# Patient Record
Sex: FEMALE | Race: WHITE | NOT HISPANIC OR LATINO | Employment: OTHER | URBAN - METROPOLITAN AREA
[De-identification: names, ages, dates, MRNs, and addresses within clinical notes are randomized per-mention and may not be internally consistent; named-entity substitution may affect disease eponyms.]

---

## 2023-06-05 DIAGNOSIS — J30.9 ALLERGIC RHINITIS, UNSPECIFIED: ICD-10-CM

## 2023-06-05 RX ORDER — FLUTICASONE PROPIONATE 50 MCG
1 SPRAY, SUSPENSION (ML) NASAL 2 TIMES DAILY
Qty: 48 ML | Refills: 3 | Status: SHIPPED | OUTPATIENT
Start: 2023-06-05

## 2023-06-06 PROBLEM — K57.90 DIVERTICULOSIS: Status: ACTIVE | Noted: 2023-06-06

## 2023-06-06 PROBLEM — H01.006 BLEPHARITIS OF BOTH EYES: Status: ACTIVE | Noted: 2023-06-06

## 2023-06-06 PROBLEM — F41.9 MILD ANXIETY: Status: ACTIVE | Noted: 2023-06-06

## 2023-06-06 PROBLEM — J06.9 VIRAL UPPER RESPIRATORY TRACT INFECTION: Status: ACTIVE | Noted: 2023-06-06

## 2023-06-06 PROBLEM — Z97.3 WEARS GLASSES: Status: ACTIVE | Noted: 2023-06-06

## 2023-06-06 PROBLEM — H69.93 DYSFUNCTION OF BOTH EUSTACHIAN TUBES: Status: ACTIVE | Noted: 2023-06-06

## 2023-06-06 PROBLEM — H10.30 ACUTE CONJUNCTIVITIS: Status: ACTIVE | Noted: 2023-06-06

## 2023-06-06 PROBLEM — H01.003 BLEPHARITIS OF BOTH EYES: Status: ACTIVE | Noted: 2023-06-06

## 2023-06-06 PROBLEM — E11.59 OBESITY, DIABETES, AND HYPERTENSION SYNDROME (MULTI): Status: ACTIVE | Noted: 2023-06-06

## 2023-06-06 PROBLEM — E11.9 DIABETES MELLITUS (MULTI): Status: ACTIVE | Noted: 2023-06-06

## 2023-06-06 PROBLEM — H93.13 TINNITUS OF BOTH EARS: Status: ACTIVE | Noted: 2023-06-06

## 2023-06-06 PROBLEM — J32.9 SINUSITIS: Status: ACTIVE | Noted: 2023-06-06

## 2023-06-06 PROBLEM — H52.4 BILATERAL PRESBYOPIA: Status: ACTIVE | Noted: 2023-06-06

## 2023-06-06 PROBLEM — E66.9 OBESITY, DIABETES, AND HYPERTENSION SYNDROME (MULTI): Status: ACTIVE | Noted: 2023-06-06

## 2023-06-06 PROBLEM — B35.4 TINEA CORPORIS: Status: ACTIVE | Noted: 2023-06-06

## 2023-06-06 PROBLEM — E55.9 VITAMIN D DEFICIENCY: Status: ACTIVE | Noted: 2023-06-06

## 2023-06-06 PROBLEM — E11.65 TYPE 2 DIABETES MELLITUS WITH HYPERGLYCEMIA (MULTI): Status: ACTIVE | Noted: 2023-06-06

## 2023-06-06 PROBLEM — J30.9 ALLERGIC RHINITIS: Status: ACTIVE | Noted: 2023-06-06

## 2023-06-06 PROBLEM — D12.6 ADENOMATOUS COLON POLYP: Status: ACTIVE | Noted: 2023-06-06

## 2023-06-06 PROBLEM — M19.90 ARTHRITIS: Status: ACTIVE | Noted: 2023-06-06

## 2023-06-06 PROBLEM — I10 HYPERTENSION: Status: ACTIVE | Noted: 2023-06-06

## 2023-06-06 PROBLEM — H52.03 HYPERMETROPIA OF BOTH EYES: Status: ACTIVE | Noted: 2023-06-06

## 2023-06-06 PROBLEM — J45.909 ASTHMA (HHS-HCC): Status: ACTIVE | Noted: 2023-06-06

## 2023-06-06 PROBLEM — E78.5 HYPERLIPIDEMIA: Status: ACTIVE | Noted: 2023-06-06

## 2023-06-06 PROBLEM — L30.9 ECZEMA: Status: ACTIVE | Noted: 2023-06-06

## 2023-06-06 PROBLEM — G89.29 CHRONIC PAIN OF LEFT KNEE: Status: ACTIVE | Noted: 2023-06-06

## 2023-06-06 PROBLEM — F43.21 SITUATIONAL DEPRESSION: Status: ACTIVE | Noted: 2023-06-06

## 2023-06-06 PROBLEM — H25.13 NUCLEAR SCLEROSIS OF BOTH EYES: Status: ACTIVE | Noted: 2023-06-06

## 2023-06-06 PROBLEM — E11.69 OBESITY, DIABETES, AND HYPERTENSION SYNDROME (MULTI): Status: ACTIVE | Noted: 2023-06-06

## 2023-06-06 PROBLEM — I15.2 OBESITY, DIABETES, AND HYPERTENSION SYNDROME (MULTI): Status: ACTIVE | Noted: 2023-06-06

## 2023-06-06 PROBLEM — M25.562 CHRONIC PAIN OF LEFT KNEE: Status: ACTIVE | Noted: 2023-06-06

## 2023-06-06 PROBLEM — E66.3 OVERWEIGHT: Status: ACTIVE | Noted: 2023-06-06

## 2023-06-06 PROBLEM — R60.9 EDEMA: Status: ACTIVE | Noted: 2023-06-06

## 2023-06-06 RX ORDER — DULAGLUTIDE 0.75 MG/.5ML
0.75 INJECTION, SOLUTION SUBCUTANEOUS
COMMUNITY
End: 2024-06-06 | Stop reason: SDUPTHER

## 2023-06-06 RX ORDER — SERTRALINE HYDROCHLORIDE 50 MG/1
1 TABLET, FILM COATED ORAL
COMMUNITY
Start: 2020-05-06 | End: 2024-03-07 | Stop reason: ALTCHOICE

## 2023-06-06 RX ORDER — MULTIVIT-MIN/IRON/FOLIC ACID/K 18-600-40
CAPSULE ORAL
COMMUNITY
Start: 2021-09-30

## 2023-06-06 RX ORDER — PERPHENAZINE 8 MG
TABLET ORAL
COMMUNITY
Start: 2022-01-06

## 2023-06-06 RX ORDER — VIT C/E/ZN/COPPR/LUTEIN/ZEAXAN 250MG-90MG
1 CAPSULE ORAL DAILY
COMMUNITY
Start: 2021-09-30 | End: 2023-10-11 | Stop reason: SDUPTHER

## 2023-06-06 RX ORDER — SERTRALINE HYDROCHLORIDE 100 MG/1
TABLET, FILM COATED ORAL
COMMUNITY
Start: 2023-06-04 | End: 2023-06-15 | Stop reason: ALTCHOICE

## 2023-06-06 RX ORDER — ALPRAZOLAM 0.5 MG/1
TABLET ORAL
COMMUNITY
Start: 2013-04-17 | End: 2023-11-03 | Stop reason: ALTCHOICE

## 2023-06-06 RX ORDER — HYDROCHLOROTHIAZIDE 25 MG/1
25 TABLET ORAL DAILY
COMMUNITY
End: 2023-08-02 | Stop reason: SDUPTHER

## 2023-06-06 RX ORDER — EPINEPHRINE 0.22MG
1 AEROSOL WITH ADAPTER (ML) INHALATION DAILY
COMMUNITY
Start: 2017-11-24

## 2023-06-06 RX ORDER — BLOOD SUGAR DIAGNOSTIC
STRIP MISCELLANEOUS
COMMUNITY
Start: 2021-10-01

## 2023-06-06 RX ORDER — CLOBETASOL PROPIONATE 0.5 MG/G
CREAM TOPICAL
COMMUNITY
Start: 2018-06-25

## 2023-06-06 RX ORDER — ALBUTEROL SULFATE 90 UG/1
AEROSOL, METERED RESPIRATORY (INHALATION)
COMMUNITY
Start: 2013-01-29

## 2023-06-06 RX ORDER — SENNOSIDES/DOCUSATE SODIUM 8.6MG-50MG
TABLET ORAL
COMMUNITY
Start: 2021-09-30

## 2023-06-06 RX ORDER — CALCIUM CARBONATE 300MG(750)
1 TABLET,CHEWABLE ORAL DAILY
COMMUNITY
Start: 2022-05-10

## 2023-06-15 ENCOUNTER — OFFICE VISIT (OUTPATIENT)
Dept: PRIMARY CARE | Facility: CLINIC | Age: 73
End: 2023-06-15
Payer: MEDICARE

## 2023-06-15 VITALS
SYSTOLIC BLOOD PRESSURE: 138 MMHG | TEMPERATURE: 97.2 F | RESPIRATION RATE: 16 BRPM | DIASTOLIC BLOOD PRESSURE: 88 MMHG | BODY MASS INDEX: 37.8 KG/M2 | WEIGHT: 200.2 LBS | OXYGEN SATURATION: 97 % | HEART RATE: 90 BPM | HEIGHT: 61 IN

## 2023-06-15 DIAGNOSIS — K31.7 GASTRIC POLYP: Chronic | ICD-10-CM

## 2023-06-15 DIAGNOSIS — I10 ESSENTIAL HYPERTENSION WITH GOAL BLOOD PRESSURE LESS THAN 130/85: Chronic | ICD-10-CM

## 2023-06-15 DIAGNOSIS — E55.9 VITAMIN D DEFICIENCY: Chronic | ICD-10-CM

## 2023-06-15 DIAGNOSIS — M19.90 ARTHRITIS: Chronic | ICD-10-CM

## 2023-06-15 DIAGNOSIS — E78.5 DYSLIPIDEMIA, GOAL LDL BELOW 70: Chronic | ICD-10-CM

## 2023-06-15 DIAGNOSIS — D12.6 ADENOMATOUS POLYP OF COLON, UNSPECIFIED PART OF COLON: Chronic | ICD-10-CM

## 2023-06-15 DIAGNOSIS — G89.29 CHRONIC RIGHT SHOULDER PAIN: Chronic | ICD-10-CM

## 2023-06-15 DIAGNOSIS — R10.13 DYSPEPSIA: Primary | Chronic | ICD-10-CM

## 2023-06-15 DIAGNOSIS — E66.01 CLASS 2 SEVERE OBESITY WITH SERIOUS COMORBIDITY AND BODY MASS INDEX (BMI) OF 37.0 TO 37.9 IN ADULT, UNSPECIFIED OBESITY TYPE (MULTI): Chronic | ICD-10-CM

## 2023-06-15 DIAGNOSIS — M25.511 CHRONIC RIGHT SHOULDER PAIN: Chronic | ICD-10-CM

## 2023-06-15 DIAGNOSIS — E11.65 TYPE 2 DIABETES MELLITUS WITH HYPERGLYCEMIA, WITHOUT LONG-TERM CURRENT USE OF INSULIN (MULTI): Chronic | ICD-10-CM

## 2023-06-15 PROBLEM — E66.812 OBESITY, CLASS II, BMI 35-39.9: Status: ACTIVE | Noted: 2021-10-22

## 2023-06-15 PROBLEM — E66.9 OBESITY, CLASS II, BMI 35-39.9: Status: ACTIVE | Noted: 2021-10-22

## 2023-06-15 PROCEDURE — 3079F DIAST BP 80-89 MM HG: CPT | Performed by: INTERNAL MEDICINE

## 2023-06-15 PROCEDURE — 99214 OFFICE O/P EST MOD 30 MIN: CPT | Performed by: INTERNAL MEDICINE

## 2023-06-15 PROCEDURE — 1036F TOBACCO NON-USER: CPT | Performed by: INTERNAL MEDICINE

## 2023-06-15 PROCEDURE — 3008F BODY MASS INDEX DOCD: CPT | Performed by: INTERNAL MEDICINE

## 2023-06-15 PROCEDURE — 1160F RVW MEDS BY RX/DR IN RCRD: CPT | Performed by: INTERNAL MEDICINE

## 2023-06-15 PROCEDURE — 3051F HG A1C>EQUAL 7.0%<8.0%: CPT | Performed by: INTERNAL MEDICINE

## 2023-06-15 PROCEDURE — 3075F SYST BP GE 130 - 139MM HG: CPT | Performed by: INTERNAL MEDICINE

## 2023-06-15 PROCEDURE — 1159F MED LIST DOCD IN RCRD: CPT | Performed by: INTERNAL MEDICINE

## 2023-06-15 ASSESSMENT — LIFESTYLE VARIABLES: HOW OFTEN DO YOU HAVE A DRINK CONTAINING ALCOHOL: MONTHLY OR LESS

## 2023-06-15 ASSESSMENT — PATIENT HEALTH QUESTIONNAIRE - PHQ9
SUM OF ALL RESPONSES TO PHQ9 QUESTIONS 1 AND 2: 0
1. LITTLE INTEREST OR PLEASURE IN DOING THINGS: NOT AT ALL
2. FEELING DOWN, DEPRESSED OR HOPELESS: NOT AT ALL

## 2023-06-15 NOTE — PROGRESS NOTES
"Subjective   Patient ID: Yoselyn Montana is a 73 y.o. female who presents for Follow-up.    Here for follow-up and review.  Overall doing fairly well.  She will be seeing her endocrinologist in July.  She is frustrated as she has remained on a restricted diet without any weight loss.  She has used my fitness pal to track her calories.  She has been doing water therapy 3 times weekly but is not really exercising aerobically she admits         Review of Systems    Objective   /88   Pulse 90   Temp 36.2 °C (97.2 °F)   Resp 16   Ht 1.549 m (5' 1\")   Wt 90.8 kg (200 lb 3.2 oz)   SpO2 97%   BMI 37.83 kg/m²     Physical Exam  Vitals reviewed.   Constitutional:       Appearance: Normal appearance.   HENT:      Head: Normocephalic and atraumatic.   Eyes:      General: No scleral icterus.        Right eye: No discharge.         Left eye: No discharge.      Extraocular Movements: Extraocular movements intact.      Conjunctiva/sclera: Conjunctivae normal.      Pupils: Pupils are equal, round, and reactive to light.   Cardiovascular:      Rate and Rhythm: Normal rate and regular rhythm.      Pulses: Normal pulses.      Heart sounds: Normal heart sounds. No murmur heard.  Pulmonary:      Effort: Pulmonary effort is normal.      Breath sounds: Normal breath sounds. No wheezing or rhonchi.   Musculoskeletal:         General: No deformity or signs of injury. Normal range of motion.      Cervical back: Normal range of motion and neck supple. No rigidity or tenderness.   Lymphadenopathy:      Cervical: No cervical adenopathy.   Skin:     General: Skin is warm and dry.      Findings: No rash.   Neurological:      General: No focal deficit present.      Mental Status: She is alert and oriented to person, place, and time. Mental status is at baseline.      Cranial Nerves: No cranial nerve deficit.      Sensory: No sensory deficit.      Gait: Gait normal.   Psychiatric:         Mood and Affect: Mood normal.         Behavior: " Behavior normal.         Thought Content: Thought content normal.         Judgment: Judgment normal.         Assessment/Plan   Problem List Items Addressed This Visit       Adenomatous colon polyp    Arthritis    Type 2 diabetes mellitus with hyperglycemia (CMS/HCC)    Vitamin D deficiency     Other Visit Diagnoses       Dyspepsia  (Chronic)   -  Primary    Chronic right shoulder pain  (Chronic)       Relevant Orders    Referral to Physical Therapy    Gastric polyp  (Chronic)       Essential hypertension with goal blood pressure less than 130/85  (Chronic)       Dyslipidemia, goal LDL below 70  (Chronic)       Class 2 severe obesity with serious comorbidity and body mass index (BMI) of 37.0 to 37.9 in adult, unspecified obesity type (CMS/HCC)  (Chronic)                Benzodiazepines:  What is the patient's goal of therapy?  Improve anxiety as needed  Is this being achieved with current treatment?  Yes      CSA: 2/27/23   PDMP  6/15/23  UDS:  9/30/19   HILL-7:  No data recorded    Activities of Daily Living:   Is your overall impression that this patient is benefiting (symptom reduction outweighs side effects) from benzodiazepine therapy? Yes     1. Physical Functioning: Better  2. Family Relationship: Better  3. Social Relationship: Better  4. Mood: Better  5. Sleep Patterns: Better  6. Overall Function: Better    Elevated 10 year cardiac risk assessment- 22% early October 2019- she declined medication for cholesterol instead opting for carbohydrate restricted diet and weight loss efforts.   Presently tolerating pravastatin. She will continue diabetic efforts now with endocrinologist and blood pressure plan and the baby aspirin daily     Diabetes- She has established with endocrinologist in early 2022- she saw her back in May 2022. A1c was 7.4%-now 7.6%. The plan to decide late summer in August whether or what medicine to start-either daily pills or a weekly shot she states. August '22 A1c 7.9%. Trulicity started.  She'll see her back in Jan '23. A1c 7% Jan '23. She will continue her medication and diabetic plan-overall improved              She will follow-up with her endocrinologist, Dr. Manley in July     Hypertension/edema/elevated weight/dyslipidemia- she will continue her weight loss efforts. She has stopped her statin in the past. Presently back on pravastatin.  Goal LDL under 70.   Blood pressure stable today. We will continue to follow with weight loss efforts. Improved on recheck.  LDL January 2023-103 on pravastatin     Mild chronic kidney disease-stage IIIA- improved on recent labs. We will continue to follow. Stable creatinine 0.99 January 2023.     Exercise routine- swimming at [facility] center 3 x wk. in the pool an hour w/ a water aerobic class. suggested 4. Just restarted walking last wk - plans to advance a block each week. encouraged indoor walking in weather. discussed cross training.   Encouraged swimming in the am, and walking before dinner. Presently swimming 1 hour 3 times weekly     BMI over 35- obesity with comorbid conditions-diabetes hypertension dyslipidemia- stable--we spoke about options. She doesn't really feel Weight Watchers in a classroom setting would be ideal for her. She has tried various diets including Weight Watchers and her own supplements. Clearly she needs to focus more on her own health.   Encouraged morning walking routine, 30 minutes 6 days weekly as her goal.presently she is swimming at the pool. She has met with a nutritional specialist. She will continue efforts on diet intake and exercise efforts    Unfortunately, her BMI has increased to 38. She is frustrated with this as nothing seems to work to lose weight. Will continue encourage efforts. Hopefully w/ Trulicity, she'll loose 10 lbs as her endocrinologist said            6/23-unfortunately her weight did not drop with the Trulicity.  She will review with her endocrinologist in July.  BMI remains at 37     Allergies / Hx  asthma / history of eustachian tube dysfunction/tinnitus - she will continue medications accordingly as above   More congestion this winter-years ago she was on allergy shots-encouraged her to get back in for an allergy evaluation with Dr. Yasir Holbrook- she will start a new drug called Viiti supplement and we will redo labs at next visit.      Left middle finger nail trauma- ecchymoses after she got this caught the door. As it turns out she was injured as a child with with this fingertip     Annual mammograms- mammogram updated January 2022 per GYN. Ordered at her 2/23 visit     Gynecologic care-she will follow up with her gynecologist regularly. She will est. sb/ Dr. Alfred after her GYN retired    Right shoulder pain-ongoing she will set up physical therapy     Left knee pain- improved with swimming before the pandemic. Encouraged walking, weight loss and Tylenol arthritis 652 pills twice daily as needed     Hand arthritis- she will limit overuse, and use the Tylenol arthritis     Colon polyps- colonoscopy updated January 2018 per Dr.. Melissa Templeton, Recommended 5 years-January 2023.               She plans to do this soon-she will call Dr. Templeton to set up this summer     Gastric polyp / occas GERD - noted on EGD. she'll follow per Dr.. Templeton as needed. no longer on PPI. EGD 1/18 unremarkable             With her family history of gastric cancer she plans to do an EGD soon in 2023.  She plans to add this on this summer     History of left hemithyroidectomy- around 2000, we'll continue to follow TFTs at least annually.     Vitamin D deficiency-she discontinued this sating headaches when she took vitamin D.   Recent vitamin D 12/21- low- she will advance this.   She will start an equivalent of 125 mg of Vitamin D daily with Viiti supplement,      Hx Osteopenia- bone density normal October 2020-     Right shoulder pain-normal range of motion but slight bicep pain proximally after pushing her father's  wheelchair a pill. At this point she doesn't feel further consultation is warranted. Less of an issue of late. Stable     Globus sensation-mainly noticeable with difficult stress. Negative EGD in the past per GI. Resolves with alprazolam suggesting stress response. She will obviously follow-up with Dr. Melissa Templeton in GI with any concerns        History of eczema- dermatology medications refilled as she does not plan to follow-up with dermatology at this point.   Encouraged consistent moisturizer use such as Cetaphil     Blepharitis- status post visit with Dr. Womack. She will use the baby shampoo as well as her lites. Improved of late     Tinnitus - bilateral-worse w/ stress. supplements not much better. Once again encouraged her to focus on allergy control     Vision exams-she will continue visits annually with Dr. Cain. At her January 2023 visit-early cataracts noted     Dental care-encouraged semiannual dental visits.        Situational depression/Anxiety- Sadly her mother passed away late March 2020. Her father passed away June 2020. [place] of 2019, she hasn't had really anybody else talk to she states. Support provided. She will continue the zoloft as ordered and continue Xanax as needed sparingly. Regarding the Xanax she has not needed this quite as often.  Her fiancé, Heber who has had very severe COPD on oxygen but fortunately has done better of late. Drug contract updated 2/27/2023.     Caregiving concerns-her long-term , Heber has had some advancing health issues. She is helping him to manage these issues       Encouraged her to update her living will last time       Flu shot each fall- updated 9/22.     Shingrix series completed     She will follow-up in 3 months, sooner as needed.

## 2023-06-16 PROBLEM — H10.30 ACUTE CONJUNCTIVITIS: Status: RESOLVED | Noted: 2023-06-06 | Resolved: 2023-06-16

## 2023-06-16 RX ORDER — MINERAL OIL
180 ENEMA (ML) RECTAL DAILY PRN
COMMUNITY

## 2023-06-16 RX ORDER — MULTIVITAMIN
1 TABLET ORAL DAILY
COMMUNITY

## 2023-07-21 ENCOUNTER — APPOINTMENT (OUTPATIENT)
Dept: PRIMARY CARE | Facility: CLINIC | Age: 73
End: 2023-07-21
Payer: MEDICARE

## 2023-07-24 ENCOUNTER — APPOINTMENT (OUTPATIENT)
Dept: PRIMARY CARE | Facility: CLINIC | Age: 73
End: 2023-07-24
Payer: MEDICARE

## 2023-08-02 DIAGNOSIS — R60.9 EDEMA, UNSPECIFIED TYPE: Primary | ICD-10-CM

## 2023-08-02 RX ORDER — HYDROCHLOROTHIAZIDE 25 MG/1
25 TABLET ORAL DAILY
Qty: 90 TABLET | Refills: 3 | Status: SHIPPED | OUTPATIENT
Start: 2023-08-02

## 2023-09-05 DIAGNOSIS — F43.21 ADJUSTMENT DISORDER WITH DEPRESSED MOOD: ICD-10-CM

## 2023-09-05 PROBLEM — E66.01 CLASS 2 SEVERE OBESITY WITH SERIOUS COMORBIDITY AND BODY MASS INDEX (BMI) OF 38.0 TO 38.9 IN ADULT (MULTI): Status: ACTIVE | Noted: 2023-09-05

## 2023-09-05 PROBLEM — Z63.6 CAREGIVER STRESS: Status: ACTIVE | Noted: 2023-09-05

## 2023-09-05 PROBLEM — E66.812 CLASS 2 SEVERE OBESITY WITH SERIOUS COMORBIDITY AND BODY MASS INDEX (BMI) OF 38.0 TO 38.9 IN ADULT: Status: ACTIVE | Noted: 2023-09-05

## 2023-09-05 PROBLEM — M25.511 RIGHT SHOULDER PAIN: Status: ACTIVE | Noted: 2023-09-05

## 2023-09-05 PROBLEM — E11.65 UNCONTROLLED TYPE 2 DIABETES MELLITUS WITH HYPERGLYCEMIA (MULTI): Status: ACTIVE | Noted: 2023-09-05

## 2023-09-05 RX ORDER — MULTIVITAMIN/IRON/FOLIC ACID 18MG-0.4MG
1 TABLET ORAL DAILY
COMMUNITY

## 2023-09-05 RX ORDER — INSULIN PUMP SYRINGE, 3 ML
EACH MISCELLANEOUS DAILY
COMMUNITY
Start: 2021-10-01

## 2023-09-05 RX ORDER — SERTRALINE HYDROCHLORIDE 100 MG/1
100 TABLET, FILM COATED ORAL NIGHTLY
Qty: 90 TABLET | Refills: 1 | Status: SHIPPED | OUTPATIENT
Start: 2023-09-05 | End: 2024-03-07 | Stop reason: ALTCHOICE

## 2023-09-05 RX ORDER — CHOLECALCIFEROL (VITAMIN D3) 125 MCG
125 CAPSULE ORAL DAILY
COMMUNITY

## 2023-09-08 ENCOUNTER — APPOINTMENT (OUTPATIENT)
Dept: PRIMARY CARE | Facility: CLINIC | Age: 73
End: 2023-09-08
Payer: MEDICARE

## 2023-09-22 RX ORDER — ASPIRIN 81 MG/1
TABLET ORAL
COMMUNITY

## 2023-09-22 RX ORDER — ELECTROLYTES/DEXTROSE
SOLUTION, ORAL ORAL
COMMUNITY

## 2023-10-02 ENCOUNTER — TREATMENT (OUTPATIENT)
Dept: PHYSICAL THERAPY | Facility: CLINIC | Age: 73
End: 2023-10-02
Payer: MEDICARE

## 2023-10-02 DIAGNOSIS — M25.511 RIGHT SHOULDER PAIN: ICD-10-CM

## 2023-10-02 DIAGNOSIS — M25.511 PAIN IN RIGHT SHOULDER: Primary | ICD-10-CM

## 2023-10-02 PROCEDURE — 97110 THERAPEUTIC EXERCISES: CPT | Mod: GP | Performed by: PHYSICAL THERAPIST

## 2023-10-02 PROCEDURE — 97140 MANUAL THERAPY 1/> REGIONS: CPT | Mod: GP | Performed by: PHYSICAL THERAPIST

## 2023-10-02 NOTE — PROGRESS NOTES
Physical Therapy Follow-up and Re-Assessment  Visit#: 2. Medicare.    Subjective: Pain and reaching ~ same. Not here recently sec to travelling and then having Covid.    Objective: AROM shoulder flex: wnl- pain ~100-140. Abd: ~80- pain. Hbb: ~SIJ- pain.    Treatment:   Therapeutic exercise: daniel Neff stretch- demo'd correctly with min cuing. Tban dbilat ER- HEP 50x, 2x/day.   Manual therapy: IV- GH flex, abd, caud and AP in flex and abd// flex: wnl- decr pain. Abd: wnl- decr pain.     Assessment: Incr ROM with rx..    Plan: Incr shoulder stabilization exer.

## 2023-10-09 ENCOUNTER — TREATMENT (OUTPATIENT)
Dept: PHYSICAL THERAPY | Facility: CLINIC | Age: 73
End: 2023-10-09
Payer: MEDICARE

## 2023-10-09 DIAGNOSIS — M25.511 PAIN IN RIGHT SHOULDER: Primary | ICD-10-CM

## 2023-10-09 PROCEDURE — 97110 THERAPEUTIC EXERCISES: CPT | Mod: GP | Performed by: PHYSICAL THERAPIST

## 2023-10-09 PROCEDURE — 97140 MANUAL THERAPY 1/> REGIONS: CPT | Mod: GP | Performed by: PHYSICAL THERAPIST

## 2023-10-09 NOTE — PROGRESS NOTES
Insurance reviewed   Visit number: 4  Approved number of visits: Medicare   Onset Date: 06/ 15/ 2023  Beginnin2023 Ending: 10/ 20/ 2023      Physical Therapy Follow-up     Precautions: none.   Fall risk: low.      Subjective:  Had incr right lat shoulder pain and periscapular pain the day after last visit- not sure if shoulder mobs or something she did at home.    Objective: AROM shoulder flex: wnl- pain. Abd: ~130- pain. Hbb: ~L5- pain.    Treatment:   Therapeutic exercise: Tband row, Tband bilat ER, ball rolling up wall into flex- all demo'd correctly.     IV-- RPA C6-T4// Flex: wnl w/o pain. Abd: ~160- decr pain. Hbb: ~L4- decr pain.       Assessment: Incr ROM with CS/TS mobs.     Plan: Incr shoulder stabilization exer.

## 2023-10-11 ENCOUNTER — OFFICE VISIT (OUTPATIENT)
Dept: ENDOCRINOLOGY | Facility: CLINIC | Age: 73
End: 2023-10-11
Payer: MEDICARE

## 2023-10-11 VITALS
HEART RATE: 73 BPM | HEIGHT: 61 IN | WEIGHT: 199.4 LBS | BODY MASS INDEX: 37.65 KG/M2 | TEMPERATURE: 98 F | DIASTOLIC BLOOD PRESSURE: 76 MMHG | SYSTOLIC BLOOD PRESSURE: 140 MMHG | RESPIRATION RATE: 16 BRPM

## 2023-10-11 DIAGNOSIS — Z79.4 TYPE 2 DIABETES MELLITUS WITH HYPERGLYCEMIA, WITH LONG-TERM CURRENT USE OF INSULIN (MULTI): ICD-10-CM

## 2023-10-11 DIAGNOSIS — E11.65 TYPE 2 DIABETES MELLITUS WITH HYPERGLYCEMIA, WITH LONG-TERM CURRENT USE OF INSULIN (MULTI): ICD-10-CM

## 2023-10-11 LAB
POC FINGERSTICK BLOOD GLUCOSE: 134 MG/DL (ref 70–100)
POC FINGERSTICK BLOOD GLUCOSE: 6.8 MG/DL (ref 70–100)

## 2023-10-11 PROCEDURE — 1036F TOBACCO NON-USER: CPT | Performed by: STUDENT IN AN ORGANIZED HEALTH CARE EDUCATION/TRAINING PROGRAM

## 2023-10-11 PROCEDURE — 82962 GLUCOSE BLOOD TEST: CPT | Performed by: STUDENT IN AN ORGANIZED HEALTH CARE EDUCATION/TRAINING PROGRAM

## 2023-10-11 PROCEDURE — 1126F AMNT PAIN NOTED NONE PRSNT: CPT | Performed by: STUDENT IN AN ORGANIZED HEALTH CARE EDUCATION/TRAINING PROGRAM

## 2023-10-11 PROCEDURE — 1160F RVW MEDS BY RX/DR IN RCRD: CPT | Performed by: STUDENT IN AN ORGANIZED HEALTH CARE EDUCATION/TRAINING PROGRAM

## 2023-10-11 PROCEDURE — 1159F MED LIST DOCD IN RCRD: CPT | Performed by: STUDENT IN AN ORGANIZED HEALTH CARE EDUCATION/TRAINING PROGRAM

## 2023-10-11 PROCEDURE — 3051F HG A1C>EQUAL 7.0%<8.0%: CPT | Performed by: STUDENT IN AN ORGANIZED HEALTH CARE EDUCATION/TRAINING PROGRAM

## 2023-10-11 PROCEDURE — 99214 OFFICE O/P EST MOD 30 MIN: CPT | Performed by: STUDENT IN AN ORGANIZED HEALTH CARE EDUCATION/TRAINING PROGRAM

## 2023-10-11 PROCEDURE — 3008F BODY MASS INDEX DOCD: CPT | Performed by: STUDENT IN AN ORGANIZED HEALTH CARE EDUCATION/TRAINING PROGRAM

## 2023-10-11 PROCEDURE — 3078F DIAST BP <80 MM HG: CPT | Performed by: STUDENT IN AN ORGANIZED HEALTH CARE EDUCATION/TRAINING PROGRAM

## 2023-10-11 PROCEDURE — 3077F SYST BP >= 140 MM HG: CPT | Performed by: STUDENT IN AN ORGANIZED HEALTH CARE EDUCATION/TRAINING PROGRAM

## 2023-10-11 RX ORDER — SEMAGLUTIDE 1.34 MG/ML
1 INJECTION, SOLUTION SUBCUTANEOUS
Qty: 9 ML | Refills: 1 | Status: SHIPPED | OUTPATIENT
Start: 2023-10-11 | End: 2023-10-16

## 2023-10-11 ASSESSMENT — PAIN SCALES - GENERAL: PAINLEVEL: 0-NO PAIN

## 2023-10-11 ASSESSMENT — ENCOUNTER SYMPTOMS: CONSTITUTIONAL NEGATIVE: 1

## 2023-10-11 NOTE — PROGRESS NOTES
Subjective   Patient ID: Yoselyn Montana is a 73 y.o. female who presents for Follow-up and Diabetes (Last A1C was 6.9% on 6.22.2023/Last glucose was 170 on 6.22.2023/).    Today's glucose was 134  Today's A1C was 6.8%  She lost 5 lbs   On ozempic for 7 weeks   Had mental fogginess for 2 weeks on it but that now settled       Review of Systems   Constitutional: Negative.        Objective   There were no vitals taken for this visit.    Physical Exam  Constitutional:       Appearance: Normal appearance.   Cardiovascular:      Rate and Rhythm: Normal rate and regular rhythm.   Pulmonary:      Effort: Pulmonary effort is normal.      Breath sounds: Normal breath sounds.   Musculoskeletal:      Cervical back: Neck supple.   Neurological:      Mental Status: She is alert.         Assessment/Plan       -  better controlled type 2 diabetes. with Hba1c of 6.8 % on GLP1   - increase Ozempic to 1mg weeklly  -not on metformin due to side effects with one of her family members while on it ( extreme fatigue)    -Hyperlipidemia controlled LDL , TG elevated on pravastatin 40 mg  not able tolerate fish oil due to diarrhea     RTC in 3-4 months

## 2023-10-12 DIAGNOSIS — Z79.4 TYPE 2 DIABETES MELLITUS WITH HYPERGLYCEMIA, WITH LONG-TERM CURRENT USE OF INSULIN (MULTI): ICD-10-CM

## 2023-10-12 DIAGNOSIS — E11.65 TYPE 2 DIABETES MELLITUS WITH HYPERGLYCEMIA, WITH LONG-TERM CURRENT USE OF INSULIN (MULTI): ICD-10-CM

## 2023-10-16 ENCOUNTER — APPOINTMENT (OUTPATIENT)
Dept: PHYSICAL THERAPY | Facility: CLINIC | Age: 73
End: 2023-10-16
Payer: MEDICARE

## 2023-10-16 DIAGNOSIS — Z79.4 TYPE 2 DIABETES MELLITUS WITH HYPERGLYCEMIA, WITH LONG-TERM CURRENT USE OF INSULIN (MULTI): ICD-10-CM

## 2023-10-16 DIAGNOSIS — E11.65 TYPE 2 DIABETES MELLITUS WITH HYPERGLYCEMIA, WITH LONG-TERM CURRENT USE OF INSULIN (MULTI): ICD-10-CM

## 2023-10-16 RX ORDER — SEMAGLUTIDE 1.34 MG/ML
1 INJECTION, SOLUTION SUBCUTANEOUS
Qty: 9 ML | Refills: 1 | Status: SHIPPED | OUTPATIENT
Start: 2023-10-16 | End: 2023-10-18

## 2023-10-18 RX ORDER — SEMAGLUTIDE 1.34 MG/ML
1 INJECTION, SOLUTION SUBCUTANEOUS
Qty: 9 ML | Refills: 1 | Status: SHIPPED | OUTPATIENT
Start: 2023-10-18 | End: 2023-10-23

## 2023-10-20 ENCOUNTER — APPOINTMENT (OUTPATIENT)
Dept: PHYSICAL THERAPY | Facility: CLINIC | Age: 73
End: 2023-10-20
Payer: MEDICARE

## 2023-10-20 DIAGNOSIS — Z79.4 TYPE 2 DIABETES MELLITUS WITH HYPERGLYCEMIA, WITH LONG-TERM CURRENT USE OF INSULIN (MULTI): ICD-10-CM

## 2023-10-20 DIAGNOSIS — E11.65 TYPE 2 DIABETES MELLITUS WITH HYPERGLYCEMIA, WITH LONG-TERM CURRENT USE OF INSULIN (MULTI): ICD-10-CM

## 2023-10-23 ENCOUNTER — APPOINTMENT (OUTPATIENT)
Dept: PHYSICAL THERAPY | Facility: CLINIC | Age: 73
End: 2023-10-23
Payer: MEDICARE

## 2023-10-23 DIAGNOSIS — E11.65 TYPE 2 DIABETES MELLITUS WITH HYPERGLYCEMIA, WITH LONG-TERM CURRENT USE OF INSULIN (MULTI): ICD-10-CM

## 2023-10-23 DIAGNOSIS — Z79.4 TYPE 2 DIABETES MELLITUS WITH HYPERGLYCEMIA, WITH LONG-TERM CURRENT USE OF INSULIN (MULTI): ICD-10-CM

## 2023-10-23 RX ORDER — SEMAGLUTIDE 1.34 MG/ML
1 INJECTION, SOLUTION SUBCUTANEOUS
Qty: 9 ML | Refills: 1 | Status: SHIPPED | OUTPATIENT
Start: 2023-10-23 | End: 2024-02-05 | Stop reason: ALTCHOICE

## 2023-10-23 RX ORDER — SEMAGLUTIDE 1.34 MG/ML
1 INJECTION, SOLUTION SUBCUTANEOUS
Qty: 9 ML | Refills: 1 | Status: SHIPPED | OUTPATIENT
Start: 2023-10-23 | End: 2023-10-23

## 2023-10-27 ENCOUNTER — APPOINTMENT (OUTPATIENT)
Dept: PHYSICAL THERAPY | Facility: CLINIC | Age: 73
End: 2023-10-27
Payer: MEDICARE

## 2023-10-30 ENCOUNTER — APPOINTMENT (OUTPATIENT)
Dept: PHYSICAL THERAPY | Facility: CLINIC | Age: 73
End: 2023-10-30
Payer: MEDICARE

## 2023-10-31 ENCOUNTER — OFFICE VISIT (OUTPATIENT)
Dept: PRIMARY CARE | Facility: CLINIC | Age: 73
End: 2023-10-31
Payer: MEDICARE

## 2023-10-31 VITALS
DIASTOLIC BLOOD PRESSURE: 84 MMHG | WEIGHT: 196.4 LBS | TEMPERATURE: 97.6 F | OXYGEN SATURATION: 95 % | RESPIRATION RATE: 16 BRPM | SYSTOLIC BLOOD PRESSURE: 138 MMHG | BODY MASS INDEX: 38.56 KG/M2 | HEART RATE: 75 BPM | HEIGHT: 60 IN

## 2023-10-31 DIAGNOSIS — I10 ESSENTIAL HYPERTENSION WITH GOAL BLOOD PRESSURE LESS THAN 130/85: Chronic | ICD-10-CM

## 2023-10-31 DIAGNOSIS — J30.1 SEASONAL ALLERGIC RHINITIS DUE TO POLLEN: ICD-10-CM

## 2023-10-31 DIAGNOSIS — E11.59 OBESITY, DIABETES, AND HYPERTENSION SYNDROME (MULTI): ICD-10-CM

## 2023-10-31 DIAGNOSIS — I15.2 OBESITY, DIABETES, AND HYPERTENSION SYNDROME (MULTI): ICD-10-CM

## 2023-10-31 DIAGNOSIS — F43.21 SITUATIONAL DEPRESSION: ICD-10-CM

## 2023-10-31 DIAGNOSIS — Z63.6 CAREGIVER STRESS: ICD-10-CM

## 2023-10-31 DIAGNOSIS — F41.9 MILD ANXIETY: ICD-10-CM

## 2023-10-31 DIAGNOSIS — E11.65 TYPE 2 DIABETES MELLITUS WITH HYPERGLYCEMIA, WITHOUT LONG-TERM CURRENT USE OF INSULIN (MULTI): ICD-10-CM

## 2023-10-31 DIAGNOSIS — D12.6 ADENOMATOUS POLYP OF COLON, UNSPECIFIED PART OF COLON: ICD-10-CM

## 2023-10-31 DIAGNOSIS — E66.9 OBESITY, DIABETES, AND HYPERTENSION SYNDROME (MULTI): ICD-10-CM

## 2023-10-31 DIAGNOSIS — Z00.00 ROUTINE GENERAL MEDICAL EXAMINATION AT HEALTH CARE FACILITY: Primary | ICD-10-CM

## 2023-10-31 DIAGNOSIS — E11.69 OBESITY, DIABETES, AND HYPERTENSION SYNDROME (MULTI): ICD-10-CM

## 2023-10-31 DIAGNOSIS — E78.5 DYSLIPIDEMIA, GOAL LDL BELOW 70: Chronic | ICD-10-CM

## 2023-10-31 DIAGNOSIS — Z23 NEED FOR INFLUENZA VACCINATION: ICD-10-CM

## 2023-10-31 DIAGNOSIS — E66.01 CLASS 2 SEVERE OBESITY WITH SERIOUS COMORBIDITY AND BODY MASS INDEX (BMI) OF 38.0 TO 38.9 IN ADULT, UNSPECIFIED OBESITY TYPE (MULTI): Chronic | ICD-10-CM

## 2023-10-31 DIAGNOSIS — Z00.00 HEALTHCARE MAINTENANCE: ICD-10-CM

## 2023-10-31 PROCEDURE — 93000 ELECTROCARDIOGRAM COMPLETE: CPT | Performed by: INTERNAL MEDICINE

## 2023-10-31 PROCEDURE — 3008F BODY MASS INDEX DOCD: CPT | Performed by: INTERNAL MEDICINE

## 2023-10-31 PROCEDURE — 1036F TOBACCO NON-USER: CPT | Performed by: INTERNAL MEDICINE

## 2023-10-31 PROCEDURE — 90662 IIV NO PRSV INCREASED AG IM: CPT | Performed by: INTERNAL MEDICINE

## 2023-10-31 PROCEDURE — G0439 PPPS, SUBSEQ VISIT: HCPCS | Performed by: INTERNAL MEDICINE

## 2023-10-31 PROCEDURE — 1159F MED LIST DOCD IN RCRD: CPT | Performed by: INTERNAL MEDICINE

## 2023-10-31 PROCEDURE — 1170F FXNL STATUS ASSESSED: CPT | Performed by: INTERNAL MEDICINE

## 2023-10-31 PROCEDURE — G0008 ADMIN INFLUENZA VIRUS VAC: HCPCS | Performed by: INTERNAL MEDICINE

## 2023-10-31 PROCEDURE — 3075F SYST BP GE 130 - 139MM HG: CPT | Performed by: INTERNAL MEDICINE

## 2023-10-31 PROCEDURE — 99214 OFFICE O/P EST MOD 30 MIN: CPT | Performed by: INTERNAL MEDICINE

## 2023-10-31 PROCEDURE — 3051F HG A1C>EQUAL 7.0%<8.0%: CPT | Performed by: INTERNAL MEDICINE

## 2023-10-31 PROCEDURE — 1160F RVW MEDS BY RX/DR IN RCRD: CPT | Performed by: INTERNAL MEDICINE

## 2023-10-31 PROCEDURE — 3079F DIAST BP 80-89 MM HG: CPT | Performed by: INTERNAL MEDICINE

## 2023-10-31 PROCEDURE — 1126F AMNT PAIN NOTED NONE PRSNT: CPT | Performed by: INTERNAL MEDICINE

## 2023-10-31 PROCEDURE — G0444 DEPRESSION SCREEN ANNUAL: HCPCS | Performed by: INTERNAL MEDICINE

## 2023-10-31 SDOH — SOCIAL STABILITY - SOCIAL INSECURITY: DEPENDENT RELATIVE NEEDING CARE AT HOME: Z63.6

## 2023-10-31 ASSESSMENT — ACTIVITIES OF DAILY LIVING (ADL)
GROCERY_SHOPPING: INDEPENDENT
TAKING_MEDICATION: INDEPENDENT
MANAGING_FINANCES: INDEPENDENT
JUDGMENT_ADEQUATE_SAFELY_COMPLETE_DAILY_ACTIVITIES: YES
BATHING: INDEPENDENT
GROOMING: INDEPENDENT
TOILETING: INDEPENDENT
ADEQUATE_TO_COMPLETE_ADL: YES
FEEDING YOURSELF: INDEPENDENT
DOING_HOUSEWORK: INDEPENDENT
PATIENT'S MEMORY ADEQUATE TO SAFELY COMPLETE DAILY ACTIVITIES?: YES
DRESSING: INDEPENDENT

## 2023-10-31 ASSESSMENT — ENCOUNTER SYMPTOMS
DEPRESSION: 0
LOSS OF SENSATION IN FEET: 0
OCCASIONAL FEELINGS OF UNSTEADINESS: 0

## 2023-10-31 ASSESSMENT — PATIENT HEALTH QUESTIONNAIRE - PHQ9
SUM OF ALL RESPONSES TO PHQ9 QUESTIONS 1 AND 2: 0
2. FEELING DOWN, DEPRESSED OR HOPELESS: NOT AT ALL
1. LITTLE INTEREST OR PLEASURE IN DOING THINGS: NOT AT ALL

## 2023-10-31 NOTE — PROGRESS NOTES
Subjective   Reason for Visit: Yoselyn Montana is an 73 y.o. female here for a Medicare Wellness visit.     Past Medical, Surgical, and Family History reviewed and updated in chart.    Reviewed all medications by prescribing practitioner or clinical pharmacist (such as prescriptions, OTCs, herbal therapies and supplements) and documented in the medical record.    Here for follow-up and wellness visit.  Its been very stressful as her fiancé has recently been admitted course has declined towards home hospice.  With his children        Patient Care Team:  Omid Smalls MD as PCP - General  Omid Smalls MD as PCP - [employer] PCP     Review of Systems    Objective   Vitals:  /84 (BP Location: Left arm, Patient Position: Sitting, BP Cuff Size: Adult)   Pulse 75   Temp 36.4 °C (97.6 °F)   Resp 16   Ht 1.524 m (5')   Wt 89.1 kg (196 lb 6.4 oz)   SpO2 95%   BMI 38.36 kg/m²       Physical Exam  Vitals reviewed.   Constitutional:       Appearance: Normal appearance.   HENT:      Head: Normocephalic and atraumatic.   Eyes:      General: No scleral icterus.        Right eye: No discharge.         Left eye: No discharge.      Extraocular Movements: Extraocular movements intact.      Conjunctiva/sclera: Conjunctivae normal.      Pupils: Pupils are equal, round, and reactive to light.   Cardiovascular:      Rate and Rhythm: Normal rate and regular rhythm.      Pulses: Normal pulses.      Heart sounds: Normal heart sounds. No murmur heard.  Pulmonary:      Effort: Pulmonary effort is normal.      Breath sounds: Normal breath sounds. No wheezing or rhonchi.   Musculoskeletal:         General: No deformity or signs of injury. Normal range of motion.      Cervical back: Normal range of motion and neck supple. No rigidity or tenderness.   Lymphadenopathy:      Cervical: No cervical adenopathy.   Skin:     General: Skin is warm and dry.      Findings: No rash.   Neurological:      General: No focal deficit  present.      Mental Status: She is alert and oriented to person, place, and time. Mental status is at baseline.      Cranial Nerves: No cranial nerve deficit.      Sensory: No sensory deficit.      Gait: Gait normal.   Psychiatric:         Mood and Affect: Mood normal.         Behavior: Behavior normal.         Thought Content: Thought content normal.         Judgment: Judgment normal.         Assessment/Plan   Problem List Items Addressed This Visit       Adenomatous colon polyp    Allergic rhinitis    Dyslipidemia, goal LDL below 70    Essential hypertension with goal blood pressure less than 130/85    Mild anxiety    Obesity, diabetes, and hypertension syndrome (CMS/HCC)    Situational depression    Type 2 diabetes mellitus with hyperglycemia (CMS/[facility])    Caregiver stress    Class 2 severe obesity with serious comorbidity and body mass index (BMI) of 38.0 to 38.9 in adult (CMS/[facility])     Other Visit Diagnoses       Routine general medical examination at health care facility    -  Primary    Relevant Orders    1 Year Follow Up In [facility] - PCP - Wellness Exam    Healthcare maintenance        Relevant Orders    ECG 12 lead (Clinic Performed)    Need for influenza vaccination        Relevant Orders    Flu vaccine, quadrivalent, high-dose, preservative free, age 65y+ (FLUZONE) (Completed)           Benzodiazepines:  What is the patient's goal of therapy?  Improve anxiety as needed  Is this being achieved with current treatment?  Yes      CSA: 2/27/23   PDMP  10/31/23  UDS:  9/30/19   HILL-7:  No data recorded    Activities of Daily Living:   Is your overall impression that this patient is benefiting (symptom reduction outweighs side effects) from benzodiazepine therapy? Yes     1. Physical Functioning: Better  2. Family Relationship: Better  3. Social Relationship: Better  4. Mood: Better  5. Sleep Patterns: Better  6. Overall Function: Better    Anxiety-in fact she has not refilled the Xanax in over a  year.  She will discontinue    Elevated 10 year cardiac risk assessment- 22% early October 2019- she declined medication for cholesterol instead opting for carbohydrate restricted diet and weight loss efforts.   Presently tolerating pravastatin. She will continue diabetic efforts now with endocrinologist and blood pressure plan and the baby aspirin daily     Diabetes- She has established with endocrinologist in early 2022- she saw her back in May 2022. A1c was 7.4%-now 7.6%. The plan to decide late summer in August whether or what medicine to start-either daily pills or a weekly shot she states. August '22 A1c 7.9%. Trulicity started. She'll see her back in Jan '23. A1c 7% Jan '23. She will continue her medication and diabetic plan-overall improved              She will follow-up with her endocrinologist, Dr. Manley regularly as directed     Hypertension/edema/elevated weight/dyslipidemia- she will continue her weight loss efforts. She has stopped her statin in the past. Presently back on pravastatin.  Goal LDL under 70.   Blood pressure stable today. We will continue to follow with weight loss efforts. Improved on recheck.  LDL January 2023-103 on pravastatin     Mild chronic kidney disease-stage IIIA- improved on recent labs. We will continue to follow. Stable creatinine 0.99 January 2023.     Exercise routine- swimming at [facility] center 3 x wk. in the pool an hour w/ a water aerobic class. suggested 4. Just restarted walking last wk - plans to advance a block each week. encouraged indoor walking in weather. discussed cross training.   Encouraged swimming in the am, and walking before              Once  she gets some help, she hopes to get back to once she gets some help, she hopes to get back to swimming 1 hour 3 times weekly     BMI over 35- obesity with comorbid conditions-diabetes hypertension dyslipidemia- stable--we spoke about options. She doesn't really feel Weight Watchers in a classroom setting would be  ideal for her. She has tried various diets including Weight Watchers and her own supplements. Clearly she needs to focus more on her own health.   Encouraged morning walking routine, 30 minutes 6 days weekly as her goal.presently she is swimming at the pool. She has met with a nutritional specialist. She will continue efforts on diet intake and exercise efforts    Unfortunately, her BMI has increased to 38. She is frustrated with this as nothing seems to work to lose weight. Will continue encourage efforts. Hopefully w/ Trulicity, she'll loose 10 lbs as her endocrinologist said            6/23-unfortunately her weight did not drop with the Trulicity.  She will review with her endocrinologist in July.  BMI remains at 37     Allergies / Hx asthma / history of eustachian tube dysfunction/tinnitus - she will continue medications accordingly as above   More congestion this winter-years ago she was on allergy shots-encouraged her to get back in for an allergy evaluation with Dr. Yasir Holbrook- she will start a new drug called Viiti supplement and we will redo labs at next visit.      Left middle finger nail trauma- ecchymoses after she got this caught the door. As it turns out she was injured as a child with with this fingertip     Annual mammograms- mammogram updated July '23      Gynecologic care-she will follow up with her gynecologist regularly. She will est. w/ Dr. Alfred after her GYN retired    Occasional back spasms-she will pursue physical therapy, and her water stretches    Right shoulder pain-ongoing she will set up physical therapy     Left knee pain- improved with swimming before the pandemic. Encouraged walking, weight loss and Tylenol arthritis 652 pills twice daily as needed     Hand arthritis- she will limit overuse, and use the Tylenol arthritis     Colon polyps- colonoscopy updated January 2018 per Dr.. Melissa Templeton, Recommended 5 years-January 2023.               Colonoscopy 8/23 with several  polyps-next colonoscopy 3 years-8/26    Gastric polyp / occas GERD - noted on EGD. she'll follow per Dr.. Templeton as needed. no longer on PPI. EGD 1/18 unremarkable             With her family history of gastric cancer she plans to do an EGD soon in 2023.  She plans to add this on this summer            8/23-EGD unremarkable.     History of left hemithyroidectomy- around 2000, we'll continue to follow TFTs at least annually.     Vitamin D deficiency-she discontinued this sating headaches when she took vitamin D.   Recent vitamin D 12/21- low- she will advance this.   She will start an equivalent of 125 mg of Vitamin D daily with Viiti supplement,      Hx Osteopenia- bone density normal October 2020-     Right shoulder pain-normal range of motion but slight bicep pain proximally after pushing her father's wheelchair a pill. At this point she doesn't feel further consultation is warranted. Less of an issue of late. Stable     Globus sensation-mainly noticeable with difficult stress. Negative EGD in the past per GI. Resolves with alprazolam suggesting stress response. She will obviously follow-up with Dr. Melissa Templeton in GI with any concerns        History of eczema- dermatology medications refilled as she does not plan to follow-up with dermatology at this point.   Encouraged consistent moisturizer use such as Cetaphil     Blepharitis- status post visit with Dr. Womack. She will use the baby shampoo as well as her lites. Improved of late     Tinnitus - bilateral-worse w/ stress. supplements not much better. Once again encouraged her to focus on allergy control     Vision exams-she will continue visits annually with Dr. Cain. At her January 2023 visit-early cataracts noted     Dental care-encouraged semiannual dental visits.        Situational depression/Anxiety- Sadly her mother passed away late March 2020. Her father passed away June 2020. [place] of 2019, she hasn't had really anybody else talk to she  states. Support provided.             10/23-her fiancé Heber has gone into home hospice.  He is at her place but its been challenging with his kids.  Support provided     Caregiving concerns-her long-term , Heber has had some advancing health issues. She is helping him to manage these issues       Encouraged her to update her living will last time       Flu shot each fall- updated 10/31/23 - today     Shingrix series completed     She will follow-up in 6 months, sooner as needed.

## 2024-02-05 ENCOUNTER — OFFICE VISIT (OUTPATIENT)
Dept: ENDOCRINOLOGY | Facility: CLINIC | Age: 74
End: 2024-02-05
Payer: MEDICARE

## 2024-02-05 VITALS
WEIGHT: 191 LBS | HEIGHT: 61 IN | DIASTOLIC BLOOD PRESSURE: 76 MMHG | BODY MASS INDEX: 36.06 KG/M2 | SYSTOLIC BLOOD PRESSURE: 135 MMHG

## 2024-02-05 DIAGNOSIS — E66.9 OBESITY, CLASS II, BMI 35-39.9: ICD-10-CM

## 2024-02-05 DIAGNOSIS — Z79.4 TYPE 2 DIABETES MELLITUS WITH HYPERGLYCEMIA, WITH LONG-TERM CURRENT USE OF INSULIN (MULTI): ICD-10-CM

## 2024-02-05 DIAGNOSIS — E11.65 TYPE 2 DIABETES MELLITUS WITH HYPERGLYCEMIA, WITH LONG-TERM CURRENT USE OF INSULIN (MULTI): ICD-10-CM

## 2024-02-05 DIAGNOSIS — E78.5 DYSLIPIDEMIA, GOAL LDL BELOW 70: Primary | ICD-10-CM

## 2024-02-05 LAB
POC FINGERSTICK BLOOD GLUCOSE: 127 MG/DL (ref 70–100)
POC HEMOGLOBIN A1C: 6.1 % (ref 4.2–6.5)

## 2024-02-05 PROCEDURE — 1126F AMNT PAIN NOTED NONE PRSNT: CPT | Performed by: STUDENT IN AN ORGANIZED HEALTH CARE EDUCATION/TRAINING PROGRAM

## 2024-02-05 PROCEDURE — 1159F MED LIST DOCD IN RCRD: CPT | Performed by: STUDENT IN AN ORGANIZED HEALTH CARE EDUCATION/TRAINING PROGRAM

## 2024-02-05 PROCEDURE — 82962 GLUCOSE BLOOD TEST: CPT | Performed by: STUDENT IN AN ORGANIZED HEALTH CARE EDUCATION/TRAINING PROGRAM

## 2024-02-05 PROCEDURE — 3078F DIAST BP <80 MM HG: CPT | Performed by: STUDENT IN AN ORGANIZED HEALTH CARE EDUCATION/TRAINING PROGRAM

## 2024-02-05 PROCEDURE — 3008F BODY MASS INDEX DOCD: CPT | Performed by: STUDENT IN AN ORGANIZED HEALTH CARE EDUCATION/TRAINING PROGRAM

## 2024-02-05 PROCEDURE — 3075F SYST BP GE 130 - 139MM HG: CPT | Performed by: STUDENT IN AN ORGANIZED HEALTH CARE EDUCATION/TRAINING PROGRAM

## 2024-02-05 PROCEDURE — 83036 HEMOGLOBIN GLYCOSYLATED A1C: CPT | Performed by: STUDENT IN AN ORGANIZED HEALTH CARE EDUCATION/TRAINING PROGRAM

## 2024-02-05 PROCEDURE — 1036F TOBACCO NON-USER: CPT | Performed by: STUDENT IN AN ORGANIZED HEALTH CARE EDUCATION/TRAINING PROGRAM

## 2024-02-05 PROCEDURE — 99214 OFFICE O/P EST MOD 30 MIN: CPT | Performed by: STUDENT IN AN ORGANIZED HEALTH CARE EDUCATION/TRAINING PROGRAM

## 2024-02-05 RX ORDER — SEMAGLUTIDE 2.68 MG/ML
2 INJECTION, SOLUTION SUBCUTANEOUS
Qty: 9 ML | Refills: 1 | Status: SHIPPED | OUTPATIENT
Start: 2024-02-05 | End: 2024-06-06 | Stop reason: WASHOUT

## 2024-02-05 ASSESSMENT — ENCOUNTER SYMPTOMS: CONSTITUTIONAL NEGATIVE: 1

## 2024-02-05 NOTE — PROGRESS NOTES
"Subjective   Patient ID: Yoselyn Montana is a 73 y.o. female who presents for Diabetes (Dx dm: /PCP [place] /Podiatry: does not see one /Eye exam: yearly; has apt on Friday 2/9/24/Patient not testing glucose at home '/Last hga1c 10/11/2. 6.8%).   Lab Results   Component Value Date    HGBA1C 6.1 02/05/2024      HPI   73 y.o. female who presents for Follow-up and Diabetes  She lost  ~ 10 lbs on Ozempic   Still dealing with caregiver stress and fatigue   Unable to exercise     Review of Systems   Constitutional: Negative.        Objective   Physical Exam  Constitutional:       Appearance: Normal appearance.   Cardiovascular:      Rate and Rhythm: Normal rate and regular rhythm.   Pulmonary:      Effort: Pulmonary effort is normal.      Visit Vitals  /76   Ht 1.549 m (5' 1\")   Wt 86.6 kg (191 lb)   BMI 36.09 kg/m²   Smoking Status Never   BSA 1.93 m²        Assessment/Plan        -  better controlled type 2 diabetes. with Hba1c of 6.1% on GLP1   - increase Ozempic to 2 mg weekly to help with weight loss   -not on metformin due to side effects with one of her family members while on it ( extreme fatigue)     -Hyperlipidemia controlled LDL , TG elevated on pravastatin 40 mg  not able tolerate fish oil due to diarrhea. She will readdress with pcp    RTC in 3 months             "

## 2024-02-09 ENCOUNTER — OFFICE VISIT (OUTPATIENT)
Dept: OPHTHALMOLOGY | Facility: CLINIC | Age: 74
End: 2024-02-09
Payer: MEDICARE

## 2024-02-09 DIAGNOSIS — H53.001 AMBLYOPIA, RIGHT EYE: ICD-10-CM

## 2024-02-09 DIAGNOSIS — H25.13 NUCLEAR SCLEROSIS OF BOTH EYES: ICD-10-CM

## 2024-02-09 DIAGNOSIS — H52.03 HYPERMETROPIA OF BOTH EYES: Primary | ICD-10-CM

## 2024-02-09 DIAGNOSIS — E11.65 TYPE 2 DIABETES MELLITUS WITH HYPERGLYCEMIA, WITHOUT LONG-TERM CURRENT USE OF INSULIN (MULTI): ICD-10-CM

## 2024-02-09 DIAGNOSIS — E08.3393: ICD-10-CM

## 2024-02-09 PROCEDURE — 92015 DETERMINE REFRACTIVE STATE: CPT | Performed by: OPTOMETRIST

## 2024-02-09 PROCEDURE — 92014 COMPRE OPH EXAM EST PT 1/>: CPT | Performed by: OPTOMETRIST

## 2024-02-09 ASSESSMENT — REFRACTION_MANIFEST
OS_ADD: +2.50
OD_SPHERE: +3.25
OS_SPHERE: +2.25
OD_ADD: +2.50

## 2024-02-09 ASSESSMENT — CONF VISUAL FIELD
OS_INFERIOR_TEMPORAL_RESTRICTION: 0
OS_NORMAL: 1
OD_INFERIOR_NASAL_RESTRICTION: 0
OS_SUPERIOR_TEMPORAL_RESTRICTION: 0
OD_INFERIOR_TEMPORAL_RESTRICTION: 0
OD_SUPERIOR_TEMPORAL_RESTRICTION: 0
OS_SUPERIOR_NASAL_RESTRICTION: 0
OD_SUPERIOR_NASAL_RESTRICTION: 0
OS_INFERIOR_NASAL_RESTRICTION: 0
OD_NORMAL: 1

## 2024-02-09 ASSESSMENT — ENCOUNTER SYMPTOMS
GASTROINTESTINAL NEGATIVE: 0
HEMATOLOGIC/LYMPHATIC NEGATIVE: 0
PSYCHIATRIC NEGATIVE: 0
EYES NEGATIVE: 1
ENDOCRINE NEGATIVE: 0
NEUROLOGICAL NEGATIVE: 0
ALLERGIC/IMMUNOLOGIC NEGATIVE: 0
MUSCULOSKELETAL NEGATIVE: 0
CONSTITUTIONAL NEGATIVE: 0
RESPIRATORY NEGATIVE: 0
CARDIOVASCULAR NEGATIVE: 0

## 2024-02-09 ASSESSMENT — TONOMETRY
OD_IOP_MMHG: 15
OS_IOP_MMHG: 15
IOP_METHOD: GOLDMANN APPLANATION

## 2024-02-09 ASSESSMENT — VISUAL ACUITY
OS_SC: 20/50-1
METHOD: SNELLEN - LINEAR
OD_SC: 20/150

## 2024-02-09 ASSESSMENT — CUP TO DISC RATIO
OD_RATIO: .3
OS_RATIO: .3

## 2024-02-09 ASSESSMENT — EXTERNAL EXAM - RIGHT EYE: OD_EXAM: NORMAL

## 2024-02-09 ASSESSMENT — EXTERNAL EXAM - LEFT EYE: OS_EXAM: NORMAL

## 2024-02-09 NOTE — PROGRESS NOTES
A spectacle prescription was dispensed to be used as needed.     Minor change. DMII and no retinopathy. I discussed the value of good blood sugar control under your management and annual eye exams. Mild cataracts.     VA corrects to 20/25 OD and 20/20 OS. 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 The patient was asked to return to our clinic in one year or sooner if ocular or vision changes occur.

## 2024-03-07 DIAGNOSIS — F43.21 ADJUSTMENT DISORDER WITH DEPRESSED MOOD: ICD-10-CM

## 2024-03-07 RX ORDER — SERTRALINE HYDROCHLORIDE 100 MG/1
100 TABLET, FILM COATED ORAL NIGHTLY
Qty: 90 TABLET | Refills: 1 | Status: SHIPPED | OUTPATIENT
Start: 2024-03-07

## 2024-03-07 NOTE — TELEPHONE ENCOUNTER
----- Message from Swapna Baez RN sent at 3/7/2024  9:58 AM EST -----  Regarding: RX  Patient is requesting a 90 day refill on Sertraline 100 mg daily be sent to [facility] in [place].

## 2024-03-25 ENCOUNTER — TELEPHONE (OUTPATIENT)
Dept: ENDOCRINOLOGY | Facility: CLINIC | Age: 74
End: 2024-03-25
Payer: MEDICARE

## 2024-03-25 NOTE — TELEPHONE ENCOUNTER
Constance called 3-25-24.  She just wanted to let you know that she is  Doing Ozempic 2.0 one week and then 1.0 the next week.  She is   Alternating due to how the 2.0 makes her feel. CLARISSA

## 2024-04-29 DIAGNOSIS — I10 ESSENTIAL (PRIMARY) HYPERTENSION: ICD-10-CM

## 2024-04-29 RX ORDER — POTASSIUM CHLORIDE 750 MG/1
TABLET, FILM COATED, EXTENDED RELEASE ORAL
Qty: 90 TABLET | Refills: 3 | Status: SHIPPED | OUTPATIENT
Start: 2024-04-29

## 2024-04-29 RX ORDER — CARVEDILOL 25 MG/1
25 TABLET ORAL
Qty: 180 TABLET | Refills: 3 | Status: SHIPPED | OUTPATIENT
Start: 2024-04-29

## 2024-05-07 ENCOUNTER — APPOINTMENT (OUTPATIENT)
Dept: PRIMARY CARE | Facility: CLINIC | Age: 74
End: 2024-05-07
Payer: MEDICARE

## 2024-05-29 DIAGNOSIS — E78.5 HYPERLIPIDEMIA, UNSPECIFIED: ICD-10-CM

## 2024-05-29 RX ORDER — PRAVASTATIN SODIUM 40 MG/1
TABLET ORAL
Qty: 90 TABLET | Refills: 3 | Status: SHIPPED | OUTPATIENT
Start: 2024-05-29

## 2024-06-06 ENCOUNTER — OFFICE VISIT (OUTPATIENT)
Dept: ENDOCRINOLOGY | Facility: CLINIC | Age: 74
End: 2024-06-06
Payer: MEDICARE

## 2024-06-06 VITALS
WEIGHT: 185 LBS | DIASTOLIC BLOOD PRESSURE: 78 MMHG | SYSTOLIC BLOOD PRESSURE: 136 MMHG | BODY MASS INDEX: 34.93 KG/M2 | HEIGHT: 61 IN

## 2024-06-06 DIAGNOSIS — E11.65 TYPE 2 DIABETES MELLITUS WITH HYPERGLYCEMIA, WITHOUT LONG-TERM CURRENT USE OF INSULIN (MULTI): Primary | ICD-10-CM

## 2024-06-06 DIAGNOSIS — E66.9 CLASS 1 OBESITY WITH BODY MASS INDEX (BMI) OF 34.0 TO 34.9 IN ADULT, UNSPECIFIED OBESITY TYPE, UNSPECIFIED WHETHER SERIOUS COMORBIDITY PRESENT: ICD-10-CM

## 2024-06-06 LAB
POC FINGERSTICK BLOOD GLUCOSE: 174 MG/DL (ref 70–100)
POC HEMOGLOBIN A1C: 6.2 % (ref 4.2–6.5)

## 2024-06-06 PROCEDURE — 82962 GLUCOSE BLOOD TEST: CPT | Performed by: STUDENT IN AN ORGANIZED HEALTH CARE EDUCATION/TRAINING PROGRAM

## 2024-06-06 PROCEDURE — 3075F SYST BP GE 130 - 139MM HG: CPT | Performed by: STUDENT IN AN ORGANIZED HEALTH CARE EDUCATION/TRAINING PROGRAM

## 2024-06-06 PROCEDURE — 99214 OFFICE O/P EST MOD 30 MIN: CPT | Performed by: STUDENT IN AN ORGANIZED HEALTH CARE EDUCATION/TRAINING PROGRAM

## 2024-06-06 PROCEDURE — 3078F DIAST BP <80 MM HG: CPT | Performed by: STUDENT IN AN ORGANIZED HEALTH CARE EDUCATION/TRAINING PROGRAM

## 2024-06-06 PROCEDURE — 1159F MED LIST DOCD IN RCRD: CPT | Performed by: STUDENT IN AN ORGANIZED HEALTH CARE EDUCATION/TRAINING PROGRAM

## 2024-06-06 PROCEDURE — 83036 HEMOGLOBIN GLYCOSYLATED A1C: CPT | Performed by: STUDENT IN AN ORGANIZED HEALTH CARE EDUCATION/TRAINING PROGRAM

## 2024-06-06 RX ORDER — DULAGLUTIDE 0.75 MG/.5ML
0.75 INJECTION, SOLUTION SUBCUTANEOUS
Qty: 6 ML | Refills: 1 | Status: SHIPPED | OUTPATIENT
Start: 2024-06-09

## 2024-06-06 ASSESSMENT — ENCOUNTER SYMPTOMS: CONSTITUTIONAL NEGATIVE: 1

## 2024-06-06 NOTE — PROGRESS NOTES
"Subjective   Patient ID: Yoselyn Montana is a 74 y.o. female who presents for Diabetes (Patient ID: Yoselyn Montana is a 73 y.o. female who presents for Diabetes 2 (Dx dm: /PCP [facility] /Podiatry: does not see one /Eye exam: yearly/Patient not testing glucose at home ).  HPI   Lab Results   Component Value Date    HGBA1C 6.2 2024      74 y.o. female who presents for Follow-up and Diabetes  She lost her partner ,  is tomorrow   She lost  ~ 15 lbs on Ozempic over past 8 months       Review of Systems   Constitutional: Negative.        Objective   Physical Exam  Constitutional:       Appearance: Normal appearance.   Cardiovascular:      Rate and Rhythm: Normal rate and regular rhythm.   Pulmonary:      Effort: Pulmonary effort is normal.      Breath sounds: Normal breath sounds.   Neurological:      General: No focal deficit present.      Mental Status: She is alert.      Visit Vitals  /78   Ht 1.549 m (5' 1\")   Wt 83.9 kg (185 lb)   BMI 34.96 kg/m²   Smoking Status Never   BSA 1.9 m²        Assessment/Plan         -  Well controlled type 2 diabetes. with Hba1c of 6.2% on GLP1   - Change Ozempic  2 mg to Trulclity 0.75 mg weekly due to cost issues   -not on metformin due to side effects with one of her family members while on it ( extreme fatigue)     -Hyperlipidemia controlled LDL , TG elevated on pravastatin 40 mg  not able tolerate fish oil due to diarrhea.      RTC in 3-4 months          "

## 2024-07-16 ENCOUNTER — APPOINTMENT (OUTPATIENT)
Dept: PRIMARY CARE | Facility: CLINIC | Age: 74
End: 2024-07-16
Payer: MEDICARE

## 2024-07-16 ENCOUNTER — LAB (OUTPATIENT)
Dept: LAB | Facility: LAB | Age: 74
End: 2024-07-16
Payer: MEDICARE

## 2024-07-16 VITALS
SYSTOLIC BLOOD PRESSURE: 124 MMHG | WEIGHT: 186.2 LBS | TEMPERATURE: 97.6 F | DIASTOLIC BLOOD PRESSURE: 80 MMHG | BODY MASS INDEX: 36.56 KG/M2 | HEIGHT: 60 IN | OXYGEN SATURATION: 97 % | HEART RATE: 76 BPM

## 2024-07-16 DIAGNOSIS — G89.29 CHRONIC RIGHT SHOULDER PAIN: Primary | ICD-10-CM

## 2024-07-16 DIAGNOSIS — E66.01 CLASS 2 SEVERE OBESITY WITH SERIOUS COMORBIDITY AND BODY MASS INDEX (BMI) OF 36.0 TO 36.9 IN ADULT, UNSPECIFIED OBESITY TYPE (MULTI): ICD-10-CM

## 2024-07-16 DIAGNOSIS — E11.59 OBESITY, DIABETES, AND HYPERTENSION SYNDROME (MULTI): ICD-10-CM

## 2024-07-16 DIAGNOSIS — E78.5 DYSLIPIDEMIA, GOAL LDL BELOW 70: ICD-10-CM

## 2024-07-16 DIAGNOSIS — M25.511 CHRONIC RIGHT SHOULDER PAIN: Primary | ICD-10-CM

## 2024-07-16 DIAGNOSIS — E08.3393: ICD-10-CM

## 2024-07-16 DIAGNOSIS — E66.9 OBESITY, DIABETES, AND HYPERTENSION SYNDROME (MULTI): ICD-10-CM

## 2024-07-16 DIAGNOSIS — F43.21 ADJUSTMENT DISORDER WITH DEPRESSED MOOD: ICD-10-CM

## 2024-07-16 DIAGNOSIS — I15.2 OBESITY, DIABETES, AND HYPERTENSION SYNDROME (MULTI): ICD-10-CM

## 2024-07-16 DIAGNOSIS — E11.69 OBESITY, DIABETES, AND HYPERTENSION SYNDROME (MULTI): ICD-10-CM

## 2024-07-16 DIAGNOSIS — E55.9 VITAMIN D DEFICIENCY: ICD-10-CM

## 2024-07-16 DIAGNOSIS — I10 ESSENTIAL HYPERTENSION WITH GOAL BLOOD PRESSURE LESS THAN 130/85: ICD-10-CM

## 2024-07-16 DIAGNOSIS — J30.1 SEASONAL ALLERGIC RHINITIS DUE TO POLLEN: ICD-10-CM

## 2024-07-16 DIAGNOSIS — Z00.00 ROUTINE GENERAL MEDICAL EXAMINATION AT HEALTH CARE FACILITY: ICD-10-CM

## 2024-07-16 PROBLEM — J06.9 VIRAL UPPER RESPIRATORY TRACT INFECTION: Status: RESOLVED | Noted: 2023-06-06 | Resolved: 2024-07-16

## 2024-07-16 PROBLEM — E66.3 OVERWEIGHT: Status: RESOLVED | Noted: 2023-06-06 | Resolved: 2024-07-16

## 2024-07-16 PROBLEM — E66.812 OBESITY, CLASS II, BMI 35-39.9: Status: RESOLVED | Noted: 2021-10-22 | Resolved: 2024-07-16

## 2024-07-16 PROBLEM — E66.812 CLASS 2 SEVERE OBESITY WITH SERIOUS COMORBIDITY AND BODY MASS INDEX (BMI) OF 38.0 TO 38.9 IN ADULT: Status: RESOLVED | Noted: 2023-09-05 | Resolved: 2024-07-16

## 2024-07-16 LAB
25(OH)D3 SERPL-MCNC: 35 NG/ML (ref 30–100)
ALT SERPL W P-5'-P-CCNC: 11 U/L (ref 7–45)
ANION GAP SERPL CALC-SCNC: 14 MMOL/L (ref 10–20)
BUN SERPL-MCNC: 26 MG/DL (ref 6–23)
CALCIUM SERPL-MCNC: 9.6 MG/DL (ref 8.6–10.6)
CHLORIDE SERPL-SCNC: 98 MMOL/L (ref 98–107)
CHOLEST SERPL-MCNC: 248 MG/DL (ref 0–199)
CHOLESTEROL/HDL RATIO: 5.7
CO2 SERPL-SCNC: 30 MMOL/L (ref 21–32)
CREAT SERPL-MCNC: 0.9 MG/DL (ref 0.5–1.05)
EGFRCR SERPLBLD CKD-EPI 2021: 67 ML/MIN/1.73M*2
GLUCOSE SERPL-MCNC: 115 MG/DL (ref 74–99)
HDLC SERPL-MCNC: 43.7 MG/DL
LDLC SERPL CALC-MCNC: 129 MG/DL
NON HDL CHOLESTEROL: 204 MG/DL (ref 0–149)
POTASSIUM SERPL-SCNC: 4.2 MMOL/L (ref 3.5–5.3)
SODIUM SERPL-SCNC: 138 MMOL/L (ref 136–145)
TRIGL SERPL-MCNC: 377 MG/DL (ref 0–149)
TSH SERPL-ACNC: 1.34 MIU/L (ref 0.44–3.98)
VLDL: 75 MG/DL (ref 0–40)

## 2024-07-16 PROCEDURE — 80048 BASIC METABOLIC PNL TOTAL CA: CPT

## 2024-07-16 PROCEDURE — 82306 VITAMIN D 25 HYDROXY: CPT

## 2024-07-16 PROCEDURE — 1160F RVW MEDS BY RX/DR IN RCRD: CPT | Performed by: INTERNAL MEDICINE

## 2024-07-16 PROCEDURE — 1036F TOBACCO NON-USER: CPT | Performed by: INTERNAL MEDICINE

## 2024-07-16 PROCEDURE — 1123F ACP DISCUSS/DSCN MKR DOCD: CPT | Performed by: INTERNAL MEDICINE

## 2024-07-16 PROCEDURE — 3079F DIAST BP 80-89 MM HG: CPT | Performed by: INTERNAL MEDICINE

## 2024-07-16 PROCEDURE — 1159F MED LIST DOCD IN RCRD: CPT | Performed by: INTERNAL MEDICINE

## 2024-07-16 PROCEDURE — 84460 ALANINE AMINO (ALT) (SGPT): CPT

## 2024-07-16 PROCEDURE — 99214 OFFICE O/P EST MOD 30 MIN: CPT | Performed by: INTERNAL MEDICINE

## 2024-07-16 PROCEDURE — 99397 PER PM REEVAL EST PAT 65+ YR: CPT | Performed by: INTERNAL MEDICINE

## 2024-07-16 PROCEDURE — 3074F SYST BP LT 130 MM HG: CPT | Performed by: INTERNAL MEDICINE

## 2024-07-16 PROCEDURE — 1170F FXNL STATUS ASSESSED: CPT | Performed by: INTERNAL MEDICINE

## 2024-07-16 PROCEDURE — 84443 ASSAY THYROID STIM HORMONE: CPT

## 2024-07-16 PROCEDURE — G0439 PPPS, SUBSEQ VISIT: HCPCS | Performed by: INTERNAL MEDICINE

## 2024-07-16 PROCEDURE — 80061 LIPID PANEL: CPT

## 2024-07-16 PROCEDURE — 36415 COLL VENOUS BLD VENIPUNCTURE: CPT

## 2024-07-16 RX ORDER — SERTRALINE HYDROCHLORIDE 100 MG/1
50 TABLET, FILM COATED ORAL NIGHTLY
Status: SHIPPED | COMMUNITY
Start: 2024-07-16

## 2024-07-16 ASSESSMENT — ACTIVITIES OF DAILY LIVING (ADL)
MANAGING_FINANCES: INDEPENDENT
JUDGMENT_ADEQUATE_SAFELY_COMPLETE_DAILY_ACTIVITIES: YES
FEEDING YOURSELF: INDEPENDENT
PATIENT'S MEMORY ADEQUATE TO SAFELY COMPLETE DAILY ACTIVITIES?: YES
TAKING_MEDICATION: INDEPENDENT
ASSISTIVE_DEVICE: EYEGLASSES
TOILETING: INDEPENDENT
DRESSING: INDEPENDENT
GROOMING: INDEPENDENT
BATHING: INDEPENDENT
DOING_HOUSEWORK: INDEPENDENT
GROCERY_SHOPPING: INDEPENDENT
ADEQUATE_TO_COMPLETE_ADL: YES

## 2024-07-16 ASSESSMENT — ENCOUNTER SYMPTOMS
DEPRESSION: 0
OCCASIONAL FEELINGS OF UNSTEADINESS: 0
LOSS OF SENSATION IN FEET: 0

## 2024-07-16 NOTE — PROGRESS NOTES
Subjective   Reason for Visit: Yoselyn Montana is an 74 y.o. female here for a Medicare Wellness visit.     Past Medical, Surgical, and Family History reviewed and updated in chart.    Reviewed all medications by prescribing practitioner or clinical pharmacist (such as prescriptions, OTCs, herbal therapies and supplements) and documented in the medical record.    Here for follow up and wellness visit.  Overall doing well for the most part.    Right shoulder has been a bit more sore of late  She is right-handed          Patient Care Team:  Omid Smalls MD as PCP - General  Omid Smalls MD as PCP - [employer] PCP     Review of Systems    Objective   Vitals:  /80   Pulse 76   Temp 36.4 °C (97.6 °F)   Ht 1.524 m (5')   Wt 84.5 kg (186 lb 3.2 oz)   SpO2 97%   BMI 36.36 kg/m²       Physical Exam  Vitals reviewed.   Constitutional:       Appearance: Normal appearance.   HENT:      Head: Normocephalic and atraumatic.   Eyes:      General: No scleral icterus.        Right eye: No discharge.         Left eye: No discharge.      Extraocular Movements: Extraocular movements intact.      Conjunctiva/sclera: Conjunctivae normal.      Pupils: Pupils are equal, round, and reactive to light.   Cardiovascular:      Rate and Rhythm: Normal rate and regular rhythm.      Pulses: Normal pulses.      Heart sounds: Normal heart sounds. No murmur heard.  Pulmonary:      Effort: Pulmonary effort is normal.      Breath sounds: Normal breath sounds. No wheezing or rhonchi.   Musculoskeletal:         General: No deformity or signs of injury. Normal range of motion.      Cervical back: Normal range of motion and neck supple. No rigidity or tenderness.   Lymphadenopathy:      Cervical: No cervical adenopathy.   Skin:     General: Skin is warm and dry.      Findings: No rash.   Neurological:      General: No focal deficit present.      Mental Status: She is alert and oriented to person, place, and time. Mental status is  at baseline.      Cranial Nerves: No cranial nerve deficit.      Sensory: No sensory deficit.      Gait: Gait normal.   Psychiatric:         Mood and Affect: Mood normal.         Behavior: Behavior normal.         Thought Content: Thought content normal.         Judgment: Judgment normal.         Assessment/Plan   Problem List Items Addressed This Visit       Allergic rhinitis    Dyslipidemia, goal LDL below 70    Relevant Orders    TSH with reflex to Free T4 if abnormal    Lipid Panel    Alanine Aminotransferase    Essential hypertension with goal blood pressure less than 130/85    Relevant Orders    Basic Metabolic Panel    Obesity, diabetes, and hypertension syndrome (Multi)    Vitamin D deficiency    Relevant Orders    Vitamin D 25-Hydroxy,Total (for eval of Vitamin D levels)    Pain in right shoulder - Primary    Relevant Orders    Referral to Orthopaedic Surgery    Diabetes mellitus due to underlying condition with 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, without long-term current use of insulin (Multi)     Other Visit Diagnoses       Class 2 severe obesity with serious comorbidity and body mass index (BMI) of 36.0 to 36.9 in adult, unspecified obesity type (Multi)        Routine general medical examination at health care facility        Relevant Orders    1 Year Follow Up In [facility] - PCP - Wellness Exam    Adjustment disorder with depressed mood        Relevant Medications    sertraline (Zoloft) 100 mg tablet          Portions of this encounter note have been copied from my previous note dated 10/31/23  , which have been updated where appropriate and all reflect my current medical decision making from today.     Bereavement/history of anxiety-prior long-term  and fiancé, brother passed away spring 2024.  She is off the Xanax.  She is remains on sertraline but only half pill daily.  She was able to visit her daughter in the [facility] for a month  after the .  She feels things are going in the right direction and remains positive.  Support provided      Elevated 10 year cardiac risk assessment- 22% early 2019- she declined medication for cholesterol instead opting for carbohydrate restricted diet and weight loss efforts.   Presently tolerating pravastatin. She will continue diabetic efforts now with endocrinologist and blood pressure plan and the baby aspirin daily               Encouraged her to restart taking the baby aspirin daily along with her pravastatin and carvedilol     Diabetes- She has established with endocrinologist in early - she saw her back in May 2022. A1c was 7.4%-now 7.6%. The plan to decide late summer in August whether or what medicine to start-either daily pills or a weekly shot she states.  A1c 7.9%. Trulicity started. She'll see her back in . A1c 7% . She will continue her medication and diabetic plan-overall improved              She will follow-up with her endocrinologist, Dr. Manley regularly as directed     Hypertension/edema-blood pressure improved    dyslipidemia- she will continue her weight loss efforts. She has stopped her statin in the past. Presently back on pravastatin.  Goal LDL under 70.             LDL 2023- on pravastatin            -she is using atorvastatin every other night-she will check lipids.  She felt it caused effects taking it nightly.     Mild chronic kidney disease-stage IIIA- improved on recent labs. We will continue to follow. Stable creatinine 0..             -Labs ordered     Exercise routine- swimming [facility] 3 x wk. in the pool an hour w/ a water aerobic class. suggested 4. Just restarted walking last wk - plans to advance a block each week. encouraged indoor walking in weather. discussed cross training.   Encouraged swimming in the am, and walking before              Once  she gets some help, she hopes to get back  to once she gets some help, she hopes to get back to swimming 1 hour 3 times weekly     BMI over 35- obesity with comorbid conditions-diabetes hypertension dyslipidemia- stable--we spoke about options. She doesn't really feel Weight Watchers in a classroom setting would be ideal for her. She has tried various diets including Weight Watchers and her own supplements. Clearly she needs to focus more on her own health.   Encouraged morning walking routine, 30 minutes 6 days weekly as her goal.presently she is swimming at the pool. She has met with a nutritional specialist. She will continue efforts on diet intake and exercise efforts    Unfortunately, her BMI has increased to 38. She is frustrated with this as nothing seems to work to lose weight. Will continue encourage efforts. Hopefully w/ Trulicity, she'll loose 10 lbs as her endocrinologist said            6/23-unfortunately her weight did not drop with the Trulicity.  She will review with her endocrinologist in July.  BMI remains at 37              7/24-BMI 36.4.  She will continue her medications and fitness and weight loss efforts     Allergies / Hx asthma / history of eustachian tube dysfunction/tinnitus - she will continue medications accordingly as above   More congestion this winter-years ago she was on allergy shots-encouraged her to get back in for an allergy evaluation with Dr. Yasir Holbrook- she will start a new drug called Viiti supplement and we will redo labs at next visit.      Left middle finger nail trauma- ecchymoses after she got this caught the door. As it turns out she was injured as a child with with this fingertip     Annual mammograms- mammogram updated July '23    encouraged her to set this up soon.     Gynecologic care-she will follow up with her gynecologist regularly. She will est. w/ Dr. Alfred after her GYN retired            She saw her in 12/23 and we will see her again this year    Occasional back spasms-she will pursue  physical therapy, and her water stretches    Right shoulder pain-ongoing she will set up physical therapy     Left knee pain- improved with swimming before the pandemic. Encouraged walking, weight loss and Tylenol arthritis 652 pills twice daily as needed     Hand arthritis- she will limit overuse, and use the Tylenol arthritis     Colon polyps- colonoscopy updated January 2018 per Dr.. Melissa Templeton, Recommended 5 years-January 2023.               Colonoscopy 8/23 with several polyps-next colonoscopy 3 years-8/26    Gastric polyp / occas GERD - noted on EGD. she'll follow per Dr.. Templeton as needed. no longer on PPI. EGD 1/18 unremarkable             With her family history of gastric cancer she plans to do an EGD soon in 2023.  She plans to add this on this summer            8/23-EGD unremarkable.     History of left hemithyroidectomy- around 2000, we'll continue to follow TFTs at least annually.     Vitamin D deficiency-she discontinued this sating headaches when she took vitamin D.   Recent vitamin D 12/21- low- she will advance this.   She will start an equivalent of 125 mg of Vitamin D daily with Viiti supplement,      Hx Osteopenia- bone density normal October 2020-     Right shoulder pain-normal range of motion but slight bicep pain proximally after pushing her father's wheelchair a pill. At this point she doesn't feel further consultation is warranted. Less of an issue of late. Stable     Globus sensation-mainly noticeable with difficult stress. Negative EGD in the past per GI. Resolves with alprazolam suggesting stress response. She will obviously follow-up with Dr. Melissa Templeton in GI with any concerns        History of eczema- dermatology medications refilled as she does not plan to follow-up with dermatology at this point.   Encouraged consistent moisturizer use such as Cetaphil     Blepharitis- status post visit with Dr. Womack. She will use the baby shampoo as well as her lites. Improved of late      Tinnitus - bilateral-worse w/ stress. supplements not much better. Once again encouraged her to focus on allergy control     Vision exams-she will continue visits annually with Dr. Cain. At her January 2023 visit-early cataracts noted.  No diabetic eye concerns on her 2/24 visit.     Dental care-encouraged semiannual dental visits.         Encouraged her to update her living will last time       Flu shot each fall- updated 10/31/23      Shingrix series completed     She will follow-up in 6 months, sooner as needed.

## 2024-07-21 DIAGNOSIS — E78.5 HYPERLIPIDEMIA, UNSPECIFIED: ICD-10-CM

## 2024-07-21 RX ORDER — PRAVASTATIN SODIUM 40 MG/1
60 TABLET ORAL NIGHTLY
Qty: 135 TABLET | Refills: 3 | Status: SHIPPED
Start: 2024-07-21 | End: 2024-07-24 | Stop reason: SDUPTHER

## 2024-07-21 NOTE — RESULT ENCOUNTER NOTE
Constance    Thanks for coming in for your visit recently, and for doing the fasting labs.  I am pleased that the kidney, liver and thyroid labs look fine as to the electrolytes and vitamin D level.    The main problem is that the LDL/bad cholesterol has worsened from where it was last time.  Ideally the LDL goal should be under 70.  Your value is 129, despite the pravastatin nightly.    At this point, I think it would be a good idea for you to increase the pravastatin, and take 1-1/2 pills each evening.  I sent a larger quantity to the "[facility] pharmacy for you to get, as you will need a higher quantity each month.    Once again thanks for doing the labs.    Wishing you the very best    Sincerely,  Omid Smalls MD   [facility]      Ruth Greenfield         [id number]  10/11/2022   Chief Complaint   Patient presents with    Follow-up     6 month ARPAN follow up with download. Former pt of Dr Chetna Correia    PAP Download:   Original or initial AHI: 92.2     Date of initial study: 6/9/17     Compliant  67%  Noncompliant 10 %     PAP Type Spont Level  IPA 53srR82 EPAP 17 cmH20   Avg Hrs/Day 6 hours 46 minutes  AHI: 4.4   Recorded compliance dates 8/29/22-9/27/22  Machine/Mfg:   [x] ResMed    [] Respironics/Dreamstation   Interface:   [] Nasal    [x] Nasal pillows   [] FFM      Provider:      [] -TERESA     []Yudith     [] Marisol    [] Dena Severin    [] Shana               [] [facility]      [x] Adaptive    [] [address]      [] Other    Neck Size: 18 inches  Mallampati 4  ESS:  1  SAQLI: 97    Here is a scan of the most recent download:                  Presentation:   Alfredito Mathew presents for 6 monthssleep medicine follow up for obstructive sleep apnea  Since the last visit, Alfredito Mathew doing better, got new supplies and feels he is not leaking as much. Equipment issues: The pressure is  acceptable, the mask is acceptable     Progress History:   Since last visit any new medical issues? No  Sleep Related Issues? No  New ER or hospital visits? No  Any new or changes in medicines? No  Any new sleep medicines? No    Review of Systems -   Review of Systems   Constitutional:  Negative for activity change, appetite change, chills, fatigue, fever and unexpected weight change. HENT: Negative. Eyes: Negative. Respiratory:  Negative for cough, shortness of breath and wheezing. Cardiovascular:  Negative for chest pain, palpitations and leg swelling. Gastrointestinal:  Negative for abdominal pain, diarrhea, nausea and vomiting. Genitourinary: Negative. Musculoskeletal: Negative. Skin: Negative. Neurological: Negative. Hematological: Negative.     Psychiatric/Behavioral: Negative. Negative for sleep disturbance. Physical Exam:    BMI:  Body mass index is 31.9 kg/m². Wt Readings from Last 3 Encounters:   10/11/22 209 lb 12.8 oz (95.2 kg)   04/05/22 218 lb 12.8 oz (99.2 kg)   09/15/20 236 lb (107 kg)     Weight lost 30 lbs over 1 year   Vitals: /82 (Site: Left Upper Arm, Position: Sitting, Cuff Size: Medium Adult)   Pulse (!) 49   Temp 98 °F (36.7 °C) (Oral)   Ht 5' 8\" (1.727 m)   Wt 209 lb 12.8 oz (95.2 kg)   SpO2 94% Comment: Patient on room air  BMI 31.90 kg/m²       Physical Exam  Vitals and nursing note reviewed. Constitutional:       Appearance: Normal appearance. He is overweight. HENT:      Head: Normocephalic and atraumatic. Mouth/Throat:      Pharynx: Oropharynx is clear. Eyes:      Conjunctiva/sclera: Conjunctivae normal.   Pulmonary:      Effort: Pulmonary effort is normal. No tachypnea, bradypnea or respiratory distress. Skin:     Findings: No erythema or rash. Neurological:      Mental Status: He is alert and oriented to person, place, and time. Psychiatric:         Attention and Perception: Attention normal.         Mood and Affect: Mood normal.         Speech: Speech normal.         Behavior: Behavior normal.         Thought Content: Thought content normal.         Cognition and Memory: Cognition normal.         Judgment: Judgment normal.         ASSESSMENT/DIAGNOSIS     Diagnosis Orders   1. ARPAN treated with BiPAP  DME Order for CPAP as OP      2. Obesity (BMI 30-39. 9)                 Plan   Do you need any equipment today? Yes -printed rx for supplies, faxed to patient's DME   -high leaks may be due to mouth breathing, is using chin strap, sleeping well even with leaks and AHI is well controlled. - Download reviewed and discussed with patient  - He  was advised to continue current positive airway pressure therapy with above described pressure.    - He  advised to keep good compliance with current recommended pressure to get optimal results and clinical improvement  - Recommend 7-9 hours of sleep with PAP  - He was advised to call [employer] company regarding supplies if needed.   -He call my office for earlier appointment if needed for worsening of sleep symptoms.   - He was instructed on weight loss  - Rajendra Quintanilla was educated about my impression and plan. Patient verbalizesunderstanding.   We will see Nelida Ortiz back in: 1 year with download    Information added by my medical assistant/LPN was reviewed today    billing based on medical decision making     EVITA Vasquez-CNP   10/11/2022

## 2024-07-24 ENCOUNTER — TELEPHONE (OUTPATIENT)
Dept: PRIMARY CARE | Facility: CLINIC | Age: 74
End: 2024-07-24

## 2024-07-24 ENCOUNTER — HOSPITAL ENCOUNTER (OUTPATIENT)
Dept: RADIOLOGY | Facility: CLINIC | Age: 74
Discharge: HOME | End: 2024-07-24
Payer: MEDICARE

## 2024-07-24 ENCOUNTER — OFFICE VISIT (OUTPATIENT)
Dept: ORTHOPEDIC SURGERY | Facility: CLINIC | Age: 74
End: 2024-07-24
Payer: MEDICARE

## 2024-07-24 ENCOUNTER — APPOINTMENT (OUTPATIENT)
Dept: ORTHOPEDIC SURGERY | Facility: CLINIC | Age: 74
End: 2024-07-24
Payer: MEDICARE

## 2024-07-24 DIAGNOSIS — M25.511 ACUTE PAIN OF RIGHT SHOULDER: ICD-10-CM

## 2024-07-24 DIAGNOSIS — M19.019 GLENOHUMERAL ARTHRITIS: Primary | ICD-10-CM

## 2024-07-24 DIAGNOSIS — E78.5 DYSLIPIDEMIA, GOAL LDL BELOW 70: Primary | ICD-10-CM

## 2024-07-24 DIAGNOSIS — E78.5 HYPERLIPIDEMIA, UNSPECIFIED: ICD-10-CM

## 2024-07-24 PROCEDURE — 99204 OFFICE O/P NEW MOD 45 MIN: CPT | Performed by: ORTHOPAEDIC SURGERY

## 2024-07-24 PROCEDURE — 3049F LDL-C 100-129 MG/DL: CPT | Performed by: ORTHOPAEDIC SURGERY

## 2024-07-24 PROCEDURE — 20610 DRAIN/INJ JOINT/BURSA W/O US: CPT | Mod: RT | Performed by: ORTHOPAEDIC SURGERY

## 2024-07-24 PROCEDURE — 99213 OFFICE O/P EST LOW 20 MIN: CPT | Mod: 25 | Performed by: ORTHOPAEDIC SURGERY

## 2024-07-24 PROCEDURE — 1123F ACP DISCUSS/DSCN MKR DOCD: CPT | Performed by: ORTHOPAEDIC SURGERY

## 2024-07-24 PROCEDURE — 73030 X-RAY EXAM OF SHOULDER: CPT | Mod: RIGHT SIDE | Performed by: ORTHOPAEDIC SURGERY

## 2024-07-24 PROCEDURE — 1159F MED LIST DOCD IN RCRD: CPT | Performed by: ORTHOPAEDIC SURGERY

## 2024-07-24 PROCEDURE — 1036F TOBACCO NON-USER: CPT | Performed by: ORTHOPAEDIC SURGERY

## 2024-07-24 PROCEDURE — 2500000005 HC RX 250 GENERAL PHARMACY W/O HCPCS: Performed by: ORTHOPAEDIC SURGERY

## 2024-07-24 PROCEDURE — 73030 X-RAY EXAM OF SHOULDER: CPT | Mod: RT

## 2024-07-24 PROCEDURE — 2500000004 HC RX 250 GENERAL PHARMACY W/ HCPCS (ALT 636 FOR OP/ED): Performed by: ORTHOPAEDIC SURGERY

## 2024-07-24 RX ORDER — LIDOCAINE HYDROCHLORIDE 10 MG/ML
5 INJECTION INFILTRATION; PERINEURAL
Status: COMPLETED | OUTPATIENT
Start: 2024-07-24 | End: 2024-07-24

## 2024-07-24 RX ORDER — PRAVASTATIN SODIUM 80 MG/1
80 TABLET ORAL NIGHTLY
Qty: 90 TABLET | Refills: 3 | Status: SHIPPED | OUTPATIENT
Start: 2024-07-24

## 2024-07-24 RX ORDER — BETAMETHASONE SODIUM PHOSPHATE AND BETAMETHASONE ACETATE 3; 3 MG/ML; MG/ML
2 INJECTION, SUSPENSION INTRA-ARTICULAR; INTRALESIONAL; INTRAMUSCULAR; SOFT TISSUE
Status: COMPLETED | OUTPATIENT
Start: 2024-07-24 | End: 2024-07-24

## 2024-07-24 NOTE — PROGRESS NOTES
History of present: Right shoulder pain limited motion    Tried some therapy was subsequently referred to  for evaluation        Past medical history:    The patient's past medical history, family history, social history and review of systems were documented on the patient's medical intake form.  The medical intake form was reviewed and scanned into the electronic medical record for future use.  History is otherwise negative except as stated in the history of present illness.    Physical exam:    General: Alert and oriented to person place and time.  No acute distress and breathing comfortably, pleasant and cooperative with examination.    Head and neck exam: Head is normocephalic atraumatic.    Neck: Supple no visible swelling or deformity.    Cardiovascular: Good perfusion to affected extremities without signs or symptoms of chest pain.    Lungs no audible wheezing or labored breathing on examination.    Abdominal exam: Nondistended nontender    Extremities: The right shoulder was inspected and was found to have no obvious deformity.  There was tenderness to touch over the lateral edges of the shoulder over the rotator cuff insertion.  Active forward flexion 120 degrees, external rotation to 60 degrees, abduction to 50 degrees, and internal rotation to the level of L2.    At this time the shoulder is neurovascular tact and neurosensory intact.  Motor intact C5-T1.  There was no obvious neck pain or radiculopathy noted.  There was no gross instability or adhesive capsulitis symptoms.  There was no evidence of apprehension or apprehension suppression.    Strength was tested in 4 planes with weakness in the supraspinatus strength testing and external rotation position.  There was no strength deficit in internal rotation.  Impingement signs were positive both supine and standing for impingement test type I and II.  There was mild pain over the bicipital groove with a positive speeds sign    Before aspiration  injection the benefits of a cortisone injection including infection, local skin irritation, skin atrophy, calcification, continued pain and discomfort, elevated blood sugar, burning, failure to relieve pain, possible late infection were discussed with the patient.    Postprocedure discomfort can be alleviated with additional medications, ice, elevation, rest over the first 24 hours as recommended.    Patient verbalized understanding and wanted to proceed with the planned procedure.    After informed consent was provided and allergies verified, the patient was positioned appropriately on thel bed.  The right shoulder to be aspirated and injected was prepped and draped in a sterile fashion.  The skin was anesthetized with ethyl chloride spray.  A joint aspiration was to be performed an 18-gauge needle was used otherwise a 22-gauge needle was used to inject the appropriate joint.    Joint injection was performed with a mixture of 5 cc 1% lidocaine plain and 2 cc Celestone Soluspan 6 mg per mL.  The needle was removed and the puncture site closed and sealed with a Band-Aid.  The patient tolerated the procedure well.        Diagnostic studies: X-rays show moderate to advanced osteoarthritic change right glenohumeral joint with near bone-on-bone arthrosis on both AP and axillary view      Impression: Right shoulder advanced arthritic change      Plan: At this point she can still do ADLs    She can still get her hand to her face    Her pain is moderate but controlled    She tried therapy which aggravated it which is not surprising based on how bad the arthritis was on x-ray    Injection provided today moist heat swimming program range of motion program I will see her back in 3 to 4 weeks if she does not get good relief we may try a fluoroscopic intra-articular injection which would be done at the hospital under radiology guidance    Failing these measures if she had more pain and or lost significant motion her operative  intervention would be shoulder arthroplasty hopefully she can avoid that for years ago        L Inj/Asp: R subacromial bursa on 7/24/2024 11:25 AM  Indications: pain  Details: 22 G needle, medial approach  Medications: 2 mL betamethasone acet,sod phos 6 mg/mL; 5 mL lidocaine 10 mg/mL (1 %)  Outcome: tolerated well, no immediate complications  Procedure, treatment alternatives, risks and benefits explained, specific risks discussed. Consent was given by the patient. Immediately prior to procedure a time out was called to verify the correct patient, procedure, equipment, support staff and site/side marked as required.

## 2024-07-24 NOTE — TELEPHONE ENCOUNTER
Patient wanted to let  Know that she will reach out to Endocrinologist to obtain the medication that was requested. CLARISSA.

## 2024-07-24 NOTE — TELEPHONE ENCOUNTER
Patient advised that the Pravastatin 80 mg was sent to her pharmacy. She states she is not taking it because the higher dosage caused muscle cramping and pain in her legs. She was only on 40 mg every other day. Please advise

## 2024-08-23 DIAGNOSIS — R60.9 EDEMA, UNSPECIFIED TYPE: ICD-10-CM

## 2024-08-23 RX ORDER — HYDROCHLOROTHIAZIDE 25 MG/1
25 TABLET ORAL DAILY
Qty: 90 TABLET | Refills: 3 | Status: SHIPPED | OUTPATIENT
Start: 2024-08-23

## 2024-09-15 DIAGNOSIS — J30.9 ALLERGIC RHINITIS, UNSPECIFIED: ICD-10-CM

## 2024-09-15 RX ORDER — FLUTICASONE PROPIONATE 50 MCG
SPRAY, SUSPENSION (ML) NASAL
Qty: 48 G | Refills: 3 | Status: SHIPPED | OUTPATIENT
Start: 2024-09-15

## 2024-09-18 ENCOUNTER — TELEPHONE (OUTPATIENT)
Dept: ORTHOPEDIC SURGERY | Facility: CLINIC | Age: 74
End: 2024-09-18
Payer: MEDICARE

## 2024-09-18 DIAGNOSIS — M25.511 ACUTE PAIN OF RIGHT SHOULDER: ICD-10-CM

## 2024-09-18 DIAGNOSIS — M19.019 GLENOHUMERAL ARTHRITIS: ICD-10-CM

## 2024-09-18 NOTE — TELEPHONE ENCOUNTER
Patient is requesting a Flouro injection for the Right Shoulder because the Cortizone injection didn't help

## 2024-10-03 ENCOUNTER — APPOINTMENT (OUTPATIENT)
Dept: ENDOCRINOLOGY | Facility: CLINIC | Age: 74
End: 2024-10-03
Payer: MEDICARE

## 2024-10-03 VITALS
SYSTOLIC BLOOD PRESSURE: 112 MMHG | DIASTOLIC BLOOD PRESSURE: 78 MMHG | HEIGHT: 61 IN | WEIGHT: 190 LBS | BODY MASS INDEX: 35.87 KG/M2

## 2024-10-03 DIAGNOSIS — E78.5 DYSLIPIDEMIA, GOAL LDL BELOW 70: ICD-10-CM

## 2024-10-03 DIAGNOSIS — E11.65 TYPE 2 DIABETES MELLITUS WITH HYPERGLYCEMIA, WITHOUT LONG-TERM CURRENT USE OF INSULIN: Primary | ICD-10-CM

## 2024-10-03 LAB
POC FINGERSTICK BLOOD GLUCOSE: 127 MG/DL (ref 70–100)
POC HEMOGLOBIN A1C: 6.3 % (ref 4.2–6.5)

## 2024-10-03 PROCEDURE — 83036 HEMOGLOBIN GLYCOSYLATED A1C: CPT | Performed by: STUDENT IN AN ORGANIZED HEALTH CARE EDUCATION/TRAINING PROGRAM

## 2024-10-03 PROCEDURE — 3074F SYST BP LT 130 MM HG: CPT | Performed by: STUDENT IN AN ORGANIZED HEALTH CARE EDUCATION/TRAINING PROGRAM

## 2024-10-03 PROCEDURE — 1123F ACP DISCUSS/DSCN MKR DOCD: CPT | Performed by: STUDENT IN AN ORGANIZED HEALTH CARE EDUCATION/TRAINING PROGRAM

## 2024-10-03 PROCEDURE — 1159F MED LIST DOCD IN RCRD: CPT | Performed by: STUDENT IN AN ORGANIZED HEALTH CARE EDUCATION/TRAINING PROGRAM

## 2024-10-03 PROCEDURE — 3078F DIAST BP <80 MM HG: CPT | Performed by: STUDENT IN AN ORGANIZED HEALTH CARE EDUCATION/TRAINING PROGRAM

## 2024-10-03 PROCEDURE — 3049F LDL-C 100-129 MG/DL: CPT | Performed by: STUDENT IN AN ORGANIZED HEALTH CARE EDUCATION/TRAINING PROGRAM

## 2024-10-03 PROCEDURE — 99214 OFFICE O/P EST MOD 30 MIN: CPT | Performed by: STUDENT IN AN ORGANIZED HEALTH CARE EDUCATION/TRAINING PROGRAM

## 2024-10-03 PROCEDURE — 82962 GLUCOSE BLOOD TEST: CPT | Performed by: STUDENT IN AN ORGANIZED HEALTH CARE EDUCATION/TRAINING PROGRAM

## 2024-10-03 NOTE — PROGRESS NOTES
"Subjective   Patient ID: Yoselyn Montana is a 74 y.o. female who presents for Diabetes ( Yoselyn Montana is a 74 y.o. female who presents for Diabetes 2 (Dx dm: /PCP [facility] /Podiatry: does not see one /Eye exam: yearly/Patient not testing glucose at home ).)   Lab Results   Component Value Date    HGBA1C 6.3 10/03/2024      HPI  74 y.o. female who presents for Follow-up and Diabetes  She complains of some dizziness that she thinks maybe due to drinking coffee or due to some grief response.  She initially lost  ~ 15 lbs on Ozempic over  8 months now off and is on trulclity , gained 5 lbs       Review of Systems    Objective   Physical Exam  Constitutional:       Appearance: Normal appearance.   Cardiovascular:      Rate and Rhythm: Normal rate and regular rhythm.   Pulmonary:      Effort: Pulmonary effort is normal.      Breath sounds: Normal breath sounds.   Musculoskeletal:      Cervical back: Neck supple.   Neurological:      Mental Status: She is alert.      Visit Vitals  /78   Ht 1.549 m (5' 1\")   Wt 86.2 kg (190 lb)   BMI 35.90 kg/m²   Smoking Status Never   BSA 1.93 m²        Assessment/Plan         -  Well controlled type 2 diabetes. with Hba1c of 6.3% on GLP1   - off  Ozempic  2 mg and is Trulclity 0.75 mg weekly, she is tolerating it better  -not on metformin due to side effects with one of her family members while on it ( extreme fatigue)     -Hyperlipidemia previosuly controlled LDL  on pravastatin 40 mg , however due to muscle cramps and fatigue she takes it every other day , agrees to start red yeast rice supplements. She was not able tolerate fish oil due to diarrhea.   We discussed Repatha , we discussed calcium scoring she wants to hold off        RTC in 4 months             "

## 2024-10-04 ENCOUNTER — APPOINTMENT (OUTPATIENT)
Dept: RADIOLOGY | Facility: HOSPITAL | Age: 74
End: 2024-10-04
Payer: MEDICARE

## 2024-10-07 ENCOUNTER — HOSPITAL ENCOUNTER (OUTPATIENT)
Dept: RADIOLOGY | Facility: HOSPITAL | Age: 74
Discharge: HOME | End: 2024-10-07
Payer: MEDICARE

## 2024-10-07 DIAGNOSIS — M19.019 GLENOHUMERAL ARTHRITIS: ICD-10-CM

## 2024-10-07 DIAGNOSIS — M25.511 ACUTE PAIN OF RIGHT SHOULDER: ICD-10-CM

## 2024-10-07 PROCEDURE — 2550000001 HC RX 255 CONTRASTS: Performed by: ORTHOPAEDIC SURGERY

## 2024-10-07 PROCEDURE — 2500000004 HC RX 250 GENERAL PHARMACY W/ HCPCS (ALT 636 FOR OP/ED): Performed by: RADIOLOGY

## 2024-10-07 PROCEDURE — 77002 NEEDLE LOCALIZATION BY XRAY: CPT | Mod: RT

## 2024-10-07 RX ORDER — BUPIVACAINE HYDROCHLORIDE 5 MG/ML
3 INJECTION, SOLUTION EPIDURAL; INTRACAUDAL ONCE
Status: COMPLETED | OUTPATIENT
Start: 2024-10-07 | End: 2024-10-07

## 2024-10-07 RX ORDER — LIDOCAINE HYDROCHLORIDE 10 MG/ML
8 INJECTION, SOLUTION EPIDURAL; INFILTRATION; INTRACAUDAL; PERINEURAL ONCE
Status: COMPLETED | OUTPATIENT
Start: 2024-10-07 | End: 2024-10-07

## 2024-10-07 RX ORDER — TRIAMCINOLONE ACETONIDE 40 MG/ML
40 INJECTION, SUSPENSION INTRA-ARTICULAR; INTRAMUSCULAR ONCE
Status: COMPLETED | OUTPATIENT
Start: 2024-10-07 | End: 2024-10-07

## 2024-11-05 ENCOUNTER — TELEPHONE (OUTPATIENT)
Dept: PRIMARY CARE | Facility: CLINIC | Age: 74
End: 2024-11-05
Payer: MEDICARE

## 2024-11-05 NOTE — PROGRESS NOTES
I received a call from Constance who feels that she is not grieving fully since Heber's death.  She feels that the Zoloft is numbing her feelings.  She has been taking Zoloft 50 mg daily and would like to discontinue.  Please advise.

## 2024-11-11 NOTE — TELEPHONE ENCOUNTER
Spoke with patient regarding tapering the sertraline.  She will use a half a tablet every other day for 2 weeks then discontinue if she feels she should be back on this she will restart 1/2 tablet daily.  She will call with concerns.  Otherwise she will follow-up in about 8 weeks as scheduled later in January

## 2024-12-06 ENCOUNTER — OFFICE VISIT (OUTPATIENT)
Dept: PRIMARY CARE | Facility: CLINIC | Age: 74
End: 2024-12-06
Payer: MEDICARE

## 2024-12-06 VITALS
DIASTOLIC BLOOD PRESSURE: 94 MMHG | WEIGHT: 197.8 LBS | HEIGHT: 61 IN | BODY MASS INDEX: 37.34 KG/M2 | SYSTOLIC BLOOD PRESSURE: 140 MMHG | OXYGEN SATURATION: 95 % | HEART RATE: 72 BPM

## 2024-12-06 DIAGNOSIS — E08.65 DIABETES MELLITUS DUE TO UNDERLYING CONDITION WITH HYPERGLYCEMIA, WITHOUT LONG-TERM CURRENT USE OF INSULIN: ICD-10-CM

## 2024-12-06 DIAGNOSIS — E56.9 VITAMIN DEFICIENCY: ICD-10-CM

## 2024-12-06 DIAGNOSIS — M79.645 FINGER PAIN, LEFT: ICD-10-CM

## 2024-12-06 DIAGNOSIS — I10 ESSENTIAL HYPERTENSION WITH GOAL BLOOD PRESSURE LESS THAN 130/85: ICD-10-CM

## 2024-12-06 DIAGNOSIS — E55.9 VITAMIN D DEFICIENCY: Primary | ICD-10-CM

## 2024-12-06 DIAGNOSIS — I10 ESSENTIAL (PRIMARY) HYPERTENSION: ICD-10-CM

## 2024-12-06 DIAGNOSIS — N18.31 CHRONIC KIDNEY DISEASE, STAGE 3A (MULTI): ICD-10-CM

## 2024-12-06 DIAGNOSIS — E78.5 DYSLIPIDEMIA, GOAL LDL BELOW 70: ICD-10-CM

## 2024-12-06 PROCEDURE — 1123F ACP DISCUSS/DSCN MKR DOCD: CPT | Performed by: INTERNAL MEDICINE

## 2024-12-06 PROCEDURE — 99214 OFFICE O/P EST MOD 30 MIN: CPT | Performed by: INTERNAL MEDICINE

## 2024-12-06 PROCEDURE — 1160F RVW MEDS BY RX/DR IN RCRD: CPT | Performed by: INTERNAL MEDICINE

## 2024-12-06 PROCEDURE — 3079F DIAST BP 80-89 MM HG: CPT | Performed by: INTERNAL MEDICINE

## 2024-12-06 PROCEDURE — 3049F LDL-C 100-129 MG/DL: CPT | Performed by: INTERNAL MEDICINE

## 2024-12-06 PROCEDURE — 1159F MED LIST DOCD IN RCRD: CPT | Performed by: INTERNAL MEDICINE

## 2024-12-06 PROCEDURE — G2211 COMPLEX E/M VISIT ADD ON: HCPCS | Performed by: INTERNAL MEDICINE

## 2024-12-06 PROCEDURE — 3075F SYST BP GE 130 - 139MM HG: CPT | Performed by: INTERNAL MEDICINE

## 2024-12-06 RX ORDER — VALACYCLOVIR HYDROCHLORIDE 1 G/1
1000 TABLET, FILM COATED ORAL 2 TIMES DAILY PRN
COMMUNITY
Start: 2024-11-13

## 2024-12-06 RX ORDER — PRAVASTATIN SODIUM 40 MG/1
40 TABLET ORAL EVERY OTHER DAY
Status: SHIPPED | COMMUNITY
Start: 2024-12-06

## 2024-12-06 RX ORDER — AZITHROMYCIN 250 MG/1
TABLET, FILM COATED ORAL
Qty: 6 TABLET | Refills: 0 | Status: SHIPPED | OUTPATIENT
Start: 2024-12-06

## 2024-12-06 RX ORDER — CARVEDILOL 25 MG/1
25 TABLET ORAL EVERY 12 HOURS
Status: SHIPPED | COMMUNITY
Start: 2024-12-06

## 2024-12-06 RX ORDER — NYSTATIN 100000 U/G
CREAM TOPICAL
COMMUNITY
Start: 2024-11-13

## 2024-12-06 RX ORDER — VIT C/E/ZN/COPPR/LUTEIN/ZEAXAN 250MG-90MG
600 CAPSULE ORAL DAILY
COMMUNITY

## 2024-12-06 ASSESSMENT — PATIENT HEALTH QUESTIONNAIRE - PHQ9
1. LITTLE INTEREST OR PLEASURE IN DOING THINGS: NOT AT ALL
2. FEELING DOWN, DEPRESSED OR HOPELESS: NOT AT ALL
SUM OF ALL RESPONSES TO PHQ9 QUESTIONS 1 AND 2: 0

## 2024-12-06 NOTE — PROGRESS NOTES
"Subjective   Patient ID: Yoselyn Montana is a 74 y.o. female who presents for Hand Pain (Left index finger ).    Here for follow-up  It has been an eventful year since her significant other passed away earlier this year  She is off the Ozempic but back on Trulicity  She tapered off Zoloft recently  And before that had stopped Xanax.    Third left middle finger is swollen and sore and tender over the last several weeks no obvious trauma    She remains on multiple vitamins         Review of Systems    Objective   /86   Pulse 72   Ht 1.549 m (5' 1\")   Wt 89.7 kg (197 lb 12.8 oz)   SpO2 95%   BMI 37.37 kg/m²     Physical Exam  Vitals reviewed.   Constitutional:       Appearance: Normal appearance.   HENT:      Head: Normocephalic and atraumatic.   Eyes:      General: No scleral icterus.        Right eye: No discharge.         Left eye: No discharge.      Extraocular Movements: Extraocular movements intact.      Conjunctiva/sclera: Conjunctivae normal.      Pupils: Pupils are equal, round, and reactive to light.   Cardiovascular:      Rate and Rhythm: Normal rate and regular rhythm.      Pulses: Normal pulses.      Heart sounds: Normal heart sounds. No murmur heard.  Pulmonary:      Effort: Pulmonary effort is normal.      Breath sounds: Normal breath sounds. No wheezing or rhonchi.   Musculoskeletal:         General: No deformity or signs of injury. Normal range of motion.      Cervical back: Normal range of motion and neck supple. No rigidity or tenderness.      Comments: Left middle finger was slightly enlarged with soft tissue swelling diffusely without erythema that was slightly tender between the DIP and PIP joints   Lymphadenopathy:      Cervical: No cervical adenopathy.   Skin:     General: Skin is warm and dry.      Findings: No rash.   Neurological:      General: No focal deficit present.      Mental Status: She is alert and oriented to person, place, and time. Mental status is at baseline.      " Cranial Nerves: No cranial nerve deficit.      Sensory: No sensory deficit.      Gait: Gait normal.   Psychiatric:         Mood and Affect: Mood normal.         Behavior: Behavior normal.         Thought Content: Thought content normal.         Judgment: Judgment normal.         Assessment/Plan   Problem List Items Addressed This Visit             ICD-10-CM    Dyslipidemia, goal LDL below 70 E78.5    Relevant Medications    pravastatin (Pravachol) 40 mg tablet    Other Relevant Orders    Lipid Panel    Alanine Aminotransferase    Essential hypertension with goal blood pressure less than 130/85 I10    Relevant Orders    Basic Metabolic Panel    Vitamin D deficiency - Primary E55.9    Relevant Orders    Vitamin D 25-Hydroxy,Total (for eval of Vitamin D levels)    Chronic kidney disease, stage 3a (Multi) N18.31     Other Visit Diagnoses         Codes    Essential (primary) hypertension     I10    Relevant Medications    carvedilol (Coreg) 25 mg tablet    Other Relevant Orders    CBC    Magnesium    Vitamin deficiency     E56.9    Relevant Orders    Vitamin B12    Iron and TIBC    Folate    Finger pain, left     M79.645    Relevant Medications    azithromycin (Zithromax) 250 mg tablet    Other Relevant Orders    Referral to Orthopaedic Surgery    Diabetes mellitus due to underlying condition with hyperglycemia, without long-term current use of insulin     E08.65    Relevant Orders    Iron and TIBC          Portions of this encounter note have been copied from my previous note dated 24 , which have been updated where appropriate and all reflect my current medical decision making from today.     Labs from 10/3/2024 reviewed  Mammogram from 10/24 reviewed    Bereavement/history of anxiety-prior long-term  and fiancé, brother passed away spring 2024.  She is off the Xanax.  She is remains on sertraline but only half pill daily.  She was able to visit her daughter in the [facility] for a month after the .   She feels things are going in the right direction and remains positive.  Support provided             12/24-doing better all things considered.  Her kids have been helpful.  She is off the Zoloft and does not feel she needs this.  She will continue her exercise routine    Left third finger soft tissue swelling and pain-over several weeks in December 2024-history and exam nondiagnostic.with her antibiotic sensitivity she will use a Z-Wiliam as directed and set up an appointment with the hand specialist at [place] for orthopedics      Elevated 10 year cardiac risk assessment- 22% early October 2019- she declined medication for cholesterol instead opting for carbohydrate restricted diet and weight loss efforts.   Presently tolerating pravastatin. She will continue diabetic efforts now with endocrinologist and blood pressure plan and the baby aspirin daily               Encouraged her to restart taking the baby aspirin daily along with her pravastatin and carvedilol     Diabetes- She has established with endocrinologist in early 2022- she saw her back in May 2022. A1c was 7.4%-now 7.6%. The plan to decide late summer in August whether or what medicine to start-either daily pills or a weekly shot she states. August '22 A1c 7.9%. Trulicity started. She'll see her back in Jan '23. A1c 7% Jan '23. She will continue her medication and diabetic plan-overall improved              She will follow-up with her endocrinologist, Dr. Manley regularly as directed           12/24-she is off Ozempic and back on Trulicity.  She will follow-up with her in the new year as scheduled.  She will continue exercise and weight loss efforts accordingly     Hypertension/edema-blood pressure improved            12/24-blood pressure improved on recheck    dyslipidemia- she will continue her weight loss efforts. She has stopped her statin in the past. Presently back on pravastatin.  Goal LDL under 70.             LDL January 2023-103 on pravastatin             7/24-she is using atorvastatin every other night-she will check lipids.  She felt it caused effects taking it nightly.     Mild chronic kidney disease-stage IIIA- improved on recent labs. We will continue to follow. Stable creatinine 0.99 January 2023.             12/24-Labs ordered     Exercise routine- swimming at [facility] center 3 x wk. in the pool an hour w/ a water aerobic class. suggested 4. Just restarted walking last wk - plans to advance a block each week. encouraged indoor walking in weather. discussed cross training.   Encouraged swimming in the am, and walking before              Once  she gets some help, she hopes to get back to once she gets some help, she hopes to get back to swimming 1 hour 3 times weekly            12/24-she is swimming     BMI over 35- obesity with comorbid conditions-diabetes hypertension dyslipidemia- stable--we spoke about options. She doesn't really feel Weight Watchers in a classroom setting would be ideal for her. She has tried various diets including Weight Watchers and her own supplements. Clearly she needs to focus more on her own health.   Encouraged morning walking routine, 30 minutes 6 days weekly as her goal.presently she is swimming at the pool. She has met with a nutritional specialist. She will continue efforts on diet intake and exercise efforts    Unfortunately, her BMI has increased to 38. She is frustrated with this as nothing seems to work to lose weight. Will continue encourage efforts. Hopefully w/ Trulicity, she'll loose 10 lbs as her endocrinologist said            6/23-unfortunately her weight did not drop with the Trulicity.  She will review with her endocrinologist in July.  BMI remains at 37              7/24-BMI 36.4.  She will continue her medications and fitness and weight loss efforts           12/24-BMI 37.4.  She will continue diet and exercise efforts as above     Allergies / Hx asthma / history of eustachian tube dysfunction/tinnitus -  she will continue medications accordingly as above   More congestion this winter-years ago she was on allergy shots-encouraged her to get back in for an allergy evaluation with Dr. Cooley    Polypharmacy-she is on multiple vitamins-at this point it is unclear whether they are beneficial-she will stop the vitamin C, magnesium, CoQ10, multivitamin and acetylcysteine and her sinechrome     Phlegm- she will start a new drug called Viiti supplement and we will redo labs at next visit.      Left middle finger nail trauma- ecchymoses after she got this caught the door. As it turns out she was injured as a child with with this fingertip     Annual mammograms- mammogram updated October 2024         Gynecologic care-she will follow up with her gynecologist regularly. She will est. sb/ Dr. Alfred after her GYN retired            She saw her in 12/23 and we will see her again this year    Occasional back spasms-she will pursue physical therapy, and her water stretches    Right shoulder arthritis-advanced-she has seen Dr. Edgardo Dash who told her she will eventually need surgery.  X-rays completed 7/24.  Her last shot did not last more than a week or so.  Limited options.  She will use caution with swimming but at this point she does not want to pursue shoulder surgery and plans to put this off     Left knee pain- improved with swimming before the pandemic. Encouraged walking, weight loss and Tylenol arthritis 652 pills twice daily as needed     Hand arthritis- she will limit overuse, and use the Tylenol arthritis     Colon polyps- colonoscopy updated January 2018 per Dr.. Melissa Templeton, Recommended 5 years-January 2023.               Colonoscopy 8/23 with several polyps-next colonoscopy 3 years-8/26    Gastric polyp / occas GERD - noted on EGD. she'll follow per Dr.. Templeton as needed. no longer on PPI. EGD 1/18 unremarkable             With her family history of gastric cancer she plans to do an EGD soon in 2023.  She  plans to add this on this summer            8/23-EGD unremarkable.     History of left hemithyroidectomy- around 2000, we'll continue to follow TFTs at least annually.     Vitamin D deficiency-she discontinued this sating headaches when she took vitamin D.   Recent vitamin D 12/21- low- she will advance this.   She will start an equivalent of 125 mg of Vitamin D daily with Viiti supplement,      Hx Osteopenia- bone density normal October 2020-     Right shoulder pain-normal range of motion but slight bicep pain proximally after pushing her father's wheelchair a pill. At this point she doesn't feel further consultation is warranted. Less of an issue of late. Stable     Globus sensation-mainly noticeable with difficult stress. Negative EGD in the past per GI. Resolves with alprazolam suggesting stress response. She will obviously follow-up with Dr. Melissa Templeton in GI with any concerns        History of eczema- dermatology medications refilled as she does not plan to follow-up with dermatology at this point.   Encouraged consistent moisturizer use such as Cetaphil     Blepharitis- status post visit with Dr. Womack. She will use the baby shampoo as well as her lites. Improved of late     Tinnitus - bilateral-worse w/ stress. supplements not much better. Once again encouraged her to focus on allergy control     Vision exams-she will continue visits annually with Dr. Cain. At her January 2023 visit-early cataracts noted.  No diabetic eye concerns on her 2/24 visit.               She will continue to see him annually     Dental care-encouraged semiannual dental visits.         Encouraged her to update her living will last time       Flu shot each fall- updated 10/31/23      Shingrix series completed     She will follow-up in 6 months, sooner as needed.     Charting was completed using voice recognition technology and may include unintended errors.

## 2024-12-09 VITALS
DIASTOLIC BLOOD PRESSURE: 86 MMHG | OXYGEN SATURATION: 95 % | WEIGHT: 197.8 LBS | SYSTOLIC BLOOD PRESSURE: 130 MMHG | BODY MASS INDEX: 37.34 KG/M2 | HEART RATE: 72 BPM | HEIGHT: 61 IN

## 2024-12-09 PROBLEM — N18.31 CHRONIC KIDNEY DISEASE, STAGE 3A (MULTI): Status: ACTIVE | Noted: 2024-12-09

## 2024-12-10 DIAGNOSIS — E11.65 TYPE 2 DIABETES MELLITUS WITH HYPERGLYCEMIA, WITHOUT LONG-TERM CURRENT USE OF INSULIN: ICD-10-CM

## 2024-12-10 RX ORDER — DULAGLUTIDE 0.75 MG/.5ML
INJECTION, SOLUTION SUBCUTANEOUS
Qty: 2 ML | Refills: 5 | Status: SHIPPED | OUTPATIENT
Start: 2024-12-10

## 2024-12-11 ENCOUNTER — TELEPHONE (OUTPATIENT)
Dept: PRIMARY CARE | Facility: CLINIC | Age: 74
End: 2024-12-11
Payer: MEDICARE

## 2024-12-11 DIAGNOSIS — R60.9 EDEMA, UNSPECIFIED TYPE: ICD-10-CM

## 2024-12-11 NOTE — PROGRESS NOTES
Patient is asking for this medication be sent to [facility]. clobetasol (Temovate) 0.05 % cream     Thank you

## 2024-12-13 DIAGNOSIS — L30.9 ECZEMA, UNSPECIFIED TYPE: Primary | ICD-10-CM

## 2024-12-13 RX ORDER — CLOBETASOL PROPIONATE 0.5 MG/G
CREAM TOPICAL
Qty: 15 G | Refills: 0 | Status: SHIPPED | OUTPATIENT
Start: 2024-12-13

## 2024-12-23 ENCOUNTER — HOSPITAL ENCOUNTER (OUTPATIENT)
Dept: RADIOLOGY | Facility: CLINIC | Age: 74
Discharge: HOME | End: 2024-12-23
Payer: MEDICARE

## 2024-12-23 ENCOUNTER — OFFICE VISIT (OUTPATIENT)
Dept: ORTHOPEDIC SURGERY | Facility: CLINIC | Age: 74
End: 2024-12-23
Payer: MEDICARE

## 2024-12-23 DIAGNOSIS — M25.442 FINGER JOINT SWELLING, LEFT: ICD-10-CM

## 2024-12-23 DIAGNOSIS — M19.042 ARTHRITIS OF FINGER OF LEFT HAND: ICD-10-CM

## 2024-12-23 PROCEDURE — 1123F ACP DISCUSS/DSCN MKR DOCD: CPT | Performed by: INTERNAL MEDICINE

## 2024-12-23 PROCEDURE — 99213 OFFICE O/P EST LOW 20 MIN: CPT | Performed by: INTERNAL MEDICINE

## 2024-12-23 PROCEDURE — 3049F LDL-C 100-129 MG/DL: CPT | Performed by: INTERNAL MEDICINE

## 2024-12-23 PROCEDURE — 73140 X-RAY EXAM OF FINGER(S): CPT | Mod: LEFT SIDE | Performed by: INTERNAL MEDICINE

## 2024-12-23 PROCEDURE — 73140 X-RAY EXAM OF FINGER(S): CPT | Mod: LT

## 2024-12-23 PROCEDURE — 99203 OFFICE O/P NEW LOW 30 MIN: CPT | Performed by: INTERNAL MEDICINE

## 2024-12-23 RX ORDER — METHYLPREDNISOLONE 4 MG/1
TABLET ORAL
Qty: 1 EACH | Refills: 0 | Status: SHIPPED | OUTPATIENT
Start: 2024-12-23

## 2024-12-23 NOTE — PROGRESS NOTES
Acute Injury New Patient Visit    CC:   Chief Complaint   Patient presents with    Left Middle Finger - Pain       HPI: Yoselyn is a 74 y.o. female presents today for evaluation for left middle finger pain and swelling which started 1 month ago. No trauma or fall. She notes persistent left middle finger pain. She denies any history of gout. She is here for initial evaluation and x-rays.  Denies any fevers or chills.        Review of Systems   GENERAL: Negative for malaise, significant weight loss, fever  MUSCULOSKELETAL: See HPI  NEURO:  Negative for numbness / tingling     Past Medical History  Past Medical History:   Diagnosis Date    Cellulitis of right finger 03/24/2016    Paronychia of finger of right hand    Cellulitis, unspecified 11/02/2015    Cellulitis    Chalazion right upper eyelid 12/17/2015    Chalazion of right upper eyelid    Chronic kidney disease, stage 3a (Multi) 01/09/2022    Chronic kidney disease, stage 3a    Chronic kidney disease, stage 3a (Multi) 12/27/2021    Chronic kidney disease, stage 3a    Chronic kidney disease, stage 3a (Multi) 01/09/2022    Chronic kidney disease, stage 3a    Class 2 severe obesity with serious comorbidity and body mass index (BMI) of 38.0 to 38.9 in adult 09/05/2023    Congenital malformation of breast, 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    Breast anomaly    Encounter for general adult medical examination without abnormal findings 02/24/2015    Welcome to Medicare preventive visit    Encounter for screening mammogram for malignant neoplasm of breast 12/06/2016    Other screening mammogram    Epigastric pain     Dyspepsia    Mastodynia 11/07/2020    Nipple pain    Obesity, Class II, BMI 35-39.9 10/22/2021    Other age-related incipient cataract, left eye 06/15/2019    Other age-related incipient cataract of left eye    Other age-related incipient cataract, right eye 06/15/2019    Other age-related incipient cataract of right eye    Other conditions influencing health status      Osteoarthritis    Other specified disorders of eustachian tube, bilateral 12/26/2018    Dysfunction of both eustachian tubes    Personal history of diseases of the skin and subcutaneous tissue     History of contact dermatitis    Personal history of other diseases of the musculoskeletal system and connective tissue 08/07/2020    History of osteopenia    Personal history of other diseases of the nervous system and sense organs 11/02/2015    History of blepharitis    Personal history of other diseases of the nervous system and sense organs 02/03/2018    History of tinnitus    Personal history of other diseases of the respiratory system 11/22/2017    History of sore throat    Personal history of other diseases of the respiratory system     History of acute bronchitis    Personal history of other diseases of the respiratory system 01/13/2016    History of sinusitis    Personal history of other specified conditions 08/07/2018    History of insomnia    Personal history of other specified conditions 06/26/2018    History of insomnia    Prediabetes 10/23/2014    Prediabetes    Prediabetes     Prediabetes    Pruritus, unspecified 09/10/2015    Pruritus of scalp    Rash and other nonspecific skin eruption 09/10/2015    Groin rash    Rash and other nonspecific skin eruption     Rash    Tinnitus, bilateral 02/05/2018    Tinnitus of both ears    Unspecified acute conjunctivitis, unspecified eye 07/13/2018    Acute conjunctivitis    Unspecified blepharitis right eye, unspecified eyelid 06/16/2020    Blepharitis of both eyes    Viral upper respiratory tract infection 06/06/2023       Medication review  Medication Documentation Review Audit       Reviewed by Omid Smalls MD (Physician) on 12/06/24 at 0943      Medication Order Taking? Sig Documenting Provider Last Dose Status     Discontinued 12/06/24 0936   aspirin 81 mg EC tablet [id number] Yes Take by mouth. Historical Provider, MD  Active   b complex 0.4 mg tablet [id number]  Yes Take 1 tablet by mouth once daily. Historical Provider, MD  Active   biotin 5 mg capsule [id number] Yes Take by mouth. Historical Provider, MD  Active   blood sugar diagnostic (Advanced Gluc Meter Test Strip) strip [id number]  TEST ONCE DAILY Historical Provider, MD  Active   carvedilol (Coreg) 25 mg tablet [id number] Yes Take 1 tablet (25 mg) by mouth every 12 hours. FOR BLOOD PRESSURE Omid Smalls MD  Active   cholecalciferol (Vitamin D-3) 125 MCG (5000 UT) capsule [id number] Yes Take 1 capsule (125 mcg) by mouth once daily. Historical Provider, MD  Active   clobetasol (Temovate) 0.05 % cream [id number] Yes APPLY TO AFFECTED AREA SPARINGLY TWICE A DAY AS NEEDED for 5-7 days, then Historical Provider, MD  Active     Discontinued 12/06/24 0937    Patient not taking:   Discontinued 12/06/24 0938   fluticasone (Flonase) 50 mcg/actuation nasal spray [id number] Yes ADMINISTER 1 SPRAY INTO EACH NOSTRIL 2 TIMES DAILY Omid Smalls MD  Active   FreeStyle glucose monitoring kit [id number]  once daily. Historical Provider, MD  Active   hydroCHLOROthiazide (HYDRODiuril) 25 mg tablet [id number] Yes TAKE 1 TABLET BY MOUTH ONCE DAILY Omid Smalls MD  Active   L.acid/L.casei/B.bif/B.trae/FOS (PROBIOTIC BLEND ORAL) [id number] Yes Take 1 capsule by mouth early in the morning.. Historical MD Conchita  Active     Discontinued 12/06/24 0938     Discontinued 12/06/24 0938     Discontinued 12/06/24 0939   NON FORMULARY [id number] Yes Take 1 each by mouth early in the morning.. Mitocor supp. daily Historical Provider, MD  Active   nystatin (Mycostatin) cream [id number] Yes  Historical Provider, MD  Active   OneTouch Ultra Test strip [id number]  once daily. Historical Provider, MD  Active   potassium chloride CR (Klor-Con M10) 10 mEq ER tablet [id number] Yes TAKE ONE TABLET BY MOUTH EVERY DAY FOR POTASSIUM Omid Smalls MD  Active   pravastatin (Pravachol) 40 mg tablet [id number] Yes Take 1 tablet (40 mg) by mouth every other day. For cholesterol Omid FRANCO  MD Slim  Active   red yeast rice 600 mg capsule [id number] Yes Take 600 mg by mouth early in the morning.. Historical Provider, MD  Active     Discontinued 12/06/24 0942   Trulicity 0.75 mg/0.5 mL pen injector [id number] Yes Inject 0.75 mg under the skin 1 (one) time per week. Pamela Meier MD  Active   valACYclovir (Valtrex) 1 gram tablet [id number] Yes Take 1 tablet (1,000 mg) by mouth 2 times a day as needed (oral sores). PRN Historical Provider, MD  Active   Ventolin HFA 90 mcg/actuation inhaler [id number] Yes Inhale. Historical Provider, MD  Active                    Allergies  Allergies   Allergen Reactions    Hydrocodone-Acetaminophen Unknown and Hallucinations    Penicillins Unknown and Other    Cat Dander Unknown    Dog Dander Unknown    Simvastatin Other     Myalgia    Sulfa (Sulfonamide Antibiotics) Diarrhea and Unknown     Pt can not tell me the reaction she has       Social History  Social History     Socioeconomic History    Marital status:      Spouse name: Not on file    Number of children: Not on file    Years of education: Not on file    Highest education level: Not on file   Occupational History    Not on file   Tobacco Use    Smoking status: Never    Smokeless tobacco: Never   Vaping Use    Vaping status: Never Used   Substance and Sexual Activity    Alcohol use: Not Currently    Drug use: Never    Sexual activity: Not on file   Other Topics Concern    Not on file   Social History Narrative    Not on file     Social Drivers of Health     Financial Resource Strain: Not on file   Food Insecurity: Not on file   Transportation Needs: Not on file   Physical Activity: Not on file   Stress: Not on file   Social Connections: Not on file   Intimate Partner Violence: Not on file   Housing Stability: Not on file       Surgical History  Past Surgical History:   Procedure Laterality Date    COLONOSCOPY  11/20/2012    Complete Colonoscopy    OTHER SURGICAL HISTORY  11/20/2012    Cholecystotomy     THYROID SURGERY  03/15/2014    Thyroid Surgery    TUBAL LIGATION  03/15/2014    Tubal Ligation       Physical Exam:  GENERAL:  Patient is awake, alert, and oriented to person place and time.  Patient appears well nourished and well kept.  Affect Calm, Not Acutely Distressed.  HEENT:  Normocephalic, Atraumatic, EOMI  CARDIOVASCULAR:  Hemodynamically stable.  RESPIRATORY:  Normal respirations with unlabored breathing.  Extremity: Left hand and third finger shows skin is intact.  Swelling along the left third finger PIP joint.  There is no erythema or warmth.  There is no clinical signs infection.  Her flexor and extensor mechanism intact.  There is no mallet deformity.  Satisfactory capillary refill time.  Mild pain with manipulation of the third finger PIP joint with flexion extension.  No pain of the DIP joint.  Right hand and wrist was then for comparison.      Diagnostics: X-rays reviewed      Procedure: None    Assessment: Left third finger osteoarthritis    Plan: Yoselyn presents today for initial evaluation for left middle finger pain which started about a month ago after no fall or traumatic injury. We recommended Medrol dose Wiliam for the acute inflammation and encouraged gentle passive range of motion. She will follow-up with the hand team in 3 to 4 weeks for reevaluation.     Orders Placed This Encounter    XR fingers left 2+ views      At the conclusion of the visit there were no further questions by the patient/family regarding their plan of care.  Patient was instructed to call or return with any issues, questions, or concerns regarding their injury and/or treatment plan described above.     12/23/24 at 1:13 PM - Sabine Walsh MD  Scribe Attestation  By signing my name below, I Alan Richardsondemo, Scribe   attest that this documentation has been prepared under the direction and in the presence of Sabine Walsh MD.    Office: [phone]    This note was prepared using voice recognition software.   The details of this note are correct and have been reviewed, and corrected to the best of my ability.  Some grammatical errors may persist related to the Dragon software.

## 2024-12-30 ENCOUNTER — TELEPHONE (OUTPATIENT)
Dept: ORTHOPEDIC SURGERY | Facility: CLINIC | Age: 74
End: 2024-12-30
Payer: MEDICARE

## 2024-12-30 NOTE — TELEPHONE ENCOUNTER
Pt lm that she saw Dr. Walsh on 12/23/2024 for a swollen finger. Pt was given MDP, she finished it and stated that it seemed to help. Pt stated that she is still having pain and is asking for another round of MDP.

## 2024-12-30 NOTE — TELEPHONE ENCOUNTER
Spoke to Dr. Walsh, he stated he cannot prescribe another MDP this soon to the previous one. Advised for patient to keep appointment with the hand team and make sure she follows up with them. Patient has an apt with Dr. Guzman on 01/14/25. Called patient and advised her of this information.

## 2025-01-13 DIAGNOSIS — J45.20 MILD INTERMITTENT ASTHMA WITHOUT COMPLICATION (HHS-HCC): Primary | ICD-10-CM

## 2025-01-13 RX ORDER — ALBUTEROL SULFATE 90 UG/1
2 AEROSOL, METERED RESPIRATORY (INHALATION) EVERY 4 HOURS PRN
Qty: 18 G | Refills: 2 | Status: SHIPPED | OUTPATIENT
Start: 2025-01-13 | End: 2025-03-14

## 2025-01-14 ENCOUNTER — LAB (OUTPATIENT)
Dept: LAB | Facility: LAB | Age: 75
End: 2025-01-14
Payer: MEDICARE

## 2025-01-14 ENCOUNTER — OFFICE VISIT (OUTPATIENT)
Dept: ORTHOPEDIC SURGERY | Facility: CLINIC | Age: 75
End: 2025-01-14
Payer: MEDICARE

## 2025-01-14 DIAGNOSIS — E08.65 DIABETES MELLITUS DUE TO UNDERLYING CONDITION WITH HYPERGLYCEMIA, WITHOUT LONG-TERM CURRENT USE OF INSULIN: ICD-10-CM

## 2025-01-14 DIAGNOSIS — E56.9 VITAMIN DEFICIENCY: ICD-10-CM

## 2025-01-14 DIAGNOSIS — E78.5 DYSLIPIDEMIA, GOAL LDL BELOW 70: ICD-10-CM

## 2025-01-14 DIAGNOSIS — I10 ESSENTIAL (PRIMARY) HYPERTENSION: ICD-10-CM

## 2025-01-14 DIAGNOSIS — I10 ESSENTIAL HYPERTENSION WITH GOAL BLOOD PRESSURE LESS THAN 130/85: ICD-10-CM

## 2025-01-14 DIAGNOSIS — M19.049 HAND ARTHRITIS: Primary | ICD-10-CM

## 2025-01-14 DIAGNOSIS — E55.9 VITAMIN D DEFICIENCY: ICD-10-CM

## 2025-01-14 LAB
25(OH)D3 SERPL-MCNC: 35 NG/ML (ref 30–100)
ALT SERPL W P-5'-P-CCNC: 22 U/L (ref 7–45)
ANION GAP SERPL CALC-SCNC: 17 MMOL/L (ref 10–20)
BUN SERPL-MCNC: 17 MG/DL (ref 6–23)
CALCIUM SERPL-MCNC: 10 MG/DL (ref 8.6–10.6)
CHLORIDE SERPL-SCNC: 100 MMOL/L (ref 98–107)
CHOLEST SERPL-MCNC: 253 MG/DL (ref 0–199)
CHOLESTEROL/HDL RATIO: 5.5
CO2 SERPL-SCNC: 29 MMOL/L (ref 21–32)
CREAT SERPL-MCNC: 0.9 MG/DL (ref 0.5–1.05)
EGFRCR SERPLBLD CKD-EPI 2021: 67 ML/MIN/1.73M*2
ERYTHROCYTE [DISTWIDTH] IN BLOOD BY AUTOMATED COUNT: 12.1 % (ref 11.5–14.5)
FOLATE SERPL-MCNC: >24 NG/ML
GLUCOSE SERPL-MCNC: 139 MG/DL (ref 74–99)
HCT VFR BLD AUTO: 42.7 % (ref 36–46)
HDLC SERPL-MCNC: 45.9 MG/DL
HGB BLD-MCNC: 14.2 G/DL (ref 12–16)
IRON SATN MFR SERPL: 25 % (ref 25–45)
IRON SERPL-MCNC: 91 UG/DL (ref 35–150)
LDLC SERPL CALC-MCNC: 135 MG/DL
MAGNESIUM SERPL-MCNC: 2.01 MG/DL (ref 1.6–2.4)
MCH RBC QN AUTO: 28.5 PG (ref 26–34)
MCHC RBC AUTO-ENTMCNC: 33.3 G/DL (ref 32–36)
MCV RBC AUTO: 86 FL (ref 80–100)
NON HDL CHOLESTEROL: 207 MG/DL (ref 0–149)
NRBC BLD-RTO: 0 /100 WBCS (ref 0–0)
PLATELET # BLD AUTO: 229 X10*3/UL (ref 150–450)
POTASSIUM SERPL-SCNC: 4.6 MMOL/L (ref 3.5–5.3)
RBC # BLD AUTO: 4.99 X10*6/UL (ref 4–5.2)
SODIUM SERPL-SCNC: 141 MMOL/L (ref 136–145)
TIBC SERPL-MCNC: 357 UG/DL (ref 240–445)
TRIGL SERPL-MCNC: 360 MG/DL (ref 0–149)
UIBC SERPL-MCNC: 266 UG/DL (ref 110–370)
VIT B12 SERPL-MCNC: 1845 PG/ML (ref 211–911)
VLDL: 72 MG/DL (ref 0–40)
WBC # BLD AUTO: 7.3 X10*3/UL (ref 4.4–11.3)

## 2025-01-14 PROCEDURE — 99214 OFFICE O/P EST MOD 30 MIN: CPT | Mod: 25 | Performed by: ORTHOPAEDIC SURGERY

## 2025-01-14 PROCEDURE — 83550 IRON BINDING TEST: CPT

## 2025-01-14 PROCEDURE — 80048 BASIC METABOLIC PNL TOTAL CA: CPT

## 2025-01-14 PROCEDURE — 2500000004 HC RX 250 GENERAL PHARMACY W/ HCPCS (ALT 636 FOR OP/ED): Performed by: ORTHOPAEDIC SURGERY

## 2025-01-14 PROCEDURE — 82306 VITAMIN D 25 HYDROXY: CPT

## 2025-01-14 PROCEDURE — 80061 LIPID PANEL: CPT

## 2025-01-14 PROCEDURE — 99214 OFFICE O/P EST MOD 30 MIN: CPT | Performed by: ORTHOPAEDIC SURGERY

## 2025-01-14 PROCEDURE — 82607 VITAMIN B-12: CPT

## 2025-01-14 PROCEDURE — 85027 COMPLETE CBC AUTOMATED: CPT

## 2025-01-14 PROCEDURE — 83735 ASSAY OF MAGNESIUM: CPT

## 2025-01-14 PROCEDURE — 82746 ASSAY OF FOLIC ACID SERUM: CPT

## 2025-01-14 PROCEDURE — 1123F ACP DISCUSS/DSCN MKR DOCD: CPT | Performed by: ORTHOPAEDIC SURGERY

## 2025-01-14 PROCEDURE — 83540 ASSAY OF IRON: CPT

## 2025-01-14 PROCEDURE — 20600 DRAIN/INJ JOINT/BURSA W/O US: CPT | Mod: LT | Performed by: ORTHOPAEDIC SURGERY

## 2025-01-14 PROCEDURE — 1159F MED LIST DOCD IN RCRD: CPT | Performed by: ORTHOPAEDIC SURGERY

## 2025-01-14 PROCEDURE — 84460 ALANINE AMINO (ALT) (SGPT): CPT

## 2025-01-14 PROCEDURE — 1036F TOBACCO NON-USER: CPT | Performed by: ORTHOPAEDIC SURGERY

## 2025-01-14 RX ORDER — LIDOCAINE HYDROCHLORIDE 10 MG/ML
0.5 INJECTION, SOLUTION INFILTRATION; PERINEURAL
Status: COMPLETED | OUTPATIENT
Start: 2025-01-14 | End: 2025-01-14

## 2025-01-14 RX ADMIN — LIDOCAINE HYDROCHLORIDE 0.5 ML: 10 INJECTION, SOLUTION INFILTRATION; PERINEURAL at 11:14

## 2025-01-14 RX ADMIN — TRIAMCINOLONE ACETONIDE 5 MG: 10 INJECTION, SUSPENSION INTRA-ARTICULAR; INTRALESIONAL at 11:14

## 2025-01-15 ENCOUNTER — APPOINTMENT (OUTPATIENT)
Dept: ENDOCRINOLOGY | Facility: CLINIC | Age: 75
End: 2025-01-15
Payer: MEDICARE

## 2025-01-15 VITALS
WEIGHT: 197 LBS | BODY MASS INDEX: 37.19 KG/M2 | DIASTOLIC BLOOD PRESSURE: 76 MMHG | HEIGHT: 61 IN | SYSTOLIC BLOOD PRESSURE: 150 MMHG

## 2025-01-15 DIAGNOSIS — E66.812 OBESITY, CLASS II, BMI 35-39.9: ICD-10-CM

## 2025-01-15 DIAGNOSIS — E11.65 TYPE 2 DIABETES MELLITUS WITH HYPERGLYCEMIA, WITHOUT LONG-TERM CURRENT USE OF INSULIN: Primary | ICD-10-CM

## 2025-01-15 DIAGNOSIS — E78.5 HYPERLIPIDEMIA, UNSPECIFIED HYPERLIPIDEMIA TYPE: ICD-10-CM

## 2025-01-15 LAB
POC FINGERSTICK BLOOD GLUCOSE: 158 MG/DL (ref 70–100)
POC HEMOGLOBIN A1C: 7.2 % (ref 4.2–6.5)

## 2025-01-15 PROCEDURE — 1159F MED LIST DOCD IN RCRD: CPT | Performed by: STUDENT IN AN ORGANIZED HEALTH CARE EDUCATION/TRAINING PROGRAM

## 2025-01-15 PROCEDURE — 1123F ACP DISCUSS/DSCN MKR DOCD: CPT | Performed by: STUDENT IN AN ORGANIZED HEALTH CARE EDUCATION/TRAINING PROGRAM

## 2025-01-15 PROCEDURE — 82962 GLUCOSE BLOOD TEST: CPT | Performed by: STUDENT IN AN ORGANIZED HEALTH CARE EDUCATION/TRAINING PROGRAM

## 2025-01-15 PROCEDURE — 83036 HEMOGLOBIN GLYCOSYLATED A1C: CPT | Performed by: STUDENT IN AN ORGANIZED HEALTH CARE EDUCATION/TRAINING PROGRAM

## 2025-01-15 PROCEDURE — 3077F SYST BP >= 140 MM HG: CPT | Performed by: STUDENT IN AN ORGANIZED HEALTH CARE EDUCATION/TRAINING PROGRAM

## 2025-01-15 PROCEDURE — 3050F LDL-C >= 130 MG/DL: CPT | Performed by: STUDENT IN AN ORGANIZED HEALTH CARE EDUCATION/TRAINING PROGRAM

## 2025-01-15 PROCEDURE — 3078F DIAST BP <80 MM HG: CPT | Performed by: STUDENT IN AN ORGANIZED HEALTH CARE EDUCATION/TRAINING PROGRAM

## 2025-01-15 PROCEDURE — 99214 OFFICE O/P EST MOD 30 MIN: CPT | Performed by: STUDENT IN AN ORGANIZED HEALTH CARE EDUCATION/TRAINING PROGRAM

## 2025-01-15 RX ORDER — TIRZEPATIDE 5 MG/.5ML
5 INJECTION, SOLUTION SUBCUTANEOUS
Qty: 6 ML | Refills: 1 | Status: SHIPPED | OUTPATIENT
Start: 2025-01-15

## 2025-01-15 NOTE — PROGRESS NOTES
"Subjective   Patient ID: Yoselyn Montana is a 74 y.o. female who presents for Diabetes ( Yoselyn Montana is a 74 y.o. female who presents for Diabetes 2 (Dx dm: /PCP [facility] /Podiatry: does not see one /Eye exam: yearly/Patient not testing glucose at home ).) Pt had steroid injection yesterday).   Lab Results   Component Value Date    HGBA1C 7.2 (A) 01/15/2025      HPI  The pt is 74 y.o. female who presents for Follow-up and Diabetes  She had multiple GCS injections since last visit , hand and shoulder  She initially lost  ~ 15 lbs on Ozempic over  8 months now off and is on trulclity   She gained 7 lbs since last visit    Review of Systems    Objective   Physical Exam  Constitutional:       Appearance: Normal appearance.   Cardiovascular:      Rate and Rhythm: Normal rate and regular rhythm.   Pulmonary:      Effort: Pulmonary effort is normal.      Breath sounds: Normal breath sounds.   Musculoskeletal:      Cervical back: Neck supple.   Neurological:      Mental Status: She is alert.      Visit Vitals  /76   Ht 1.549 m (5' 1\")   Wt 89.4 kg (197 lb)   BMI 37.22 kg/m²   Smoking Status Never   BSA 1.96 m²        Assessment/Plan           -  Well controlled type 2 diabetes. with Hba1c of 7.2 was  6.3% on GLP1 . Will change to GLP/GIP to help with glycemic control, and weight.  Start Mounjaro 2.5 mg weekly ( office sample ) , then uptitrate to 5 mg weekly prescription sent    -not on metformin due to side effects with one of her family members while on it   ( extreme fatigue)     -Hyperlipidemia previosuly controlled LDL  on pravastatin 40 mg , however due to muscle cramps and fatigue she takes it every other day and red yeast rice supplement every other day. She was not able tolerate fish oil due to diarrhea.   We previously  discussed Repatha  and calcium scoring she wants to hold off         Follow up with endocrinology in 4 months             "

## 2025-01-15 NOTE — PROGRESS NOTES
"Subjective   Patient ID: Yoselyn Montana is a 74 y.o. female who presents for Diabetes ( Yoselyn Montana is a 74 y.o. female who presents for Diabetes 2 (Dx dm: /PCP [facility] /Podiatry: does not see one /Eye exam: yearly/Patient not testing glucose at home ).) Pt had steroid injection yesterday).   Lab Results   Component Value Date    HGBA1C 6.3 10/03/2024      HPI          Review of Systems    Objective   Physical Exam Visit Vitals  /76   Ht 1.549 m (5' 1\")   Wt 89.4 kg (197 lb)   BMI 37.22 kg/m²   Smoking Status Never   BSA 1.96 m²        Assessment/Plan                 "

## 2025-01-16 NOTE — RESULT ENCOUNTER NOTE
Results reviewed. No urgent findings.  Will Review results in detail at upcoming office appointment scheduled soon.      Omid Smalls MD

## 2025-01-17 ENCOUNTER — OFFICE VISIT (OUTPATIENT)
Dept: PRIMARY CARE | Facility: CLINIC | Age: 75
End: 2025-01-17
Payer: MEDICARE

## 2025-01-17 VITALS
SYSTOLIC BLOOD PRESSURE: 138 MMHG | BODY MASS INDEX: 38.29 KG/M2 | WEIGHT: 202.8 LBS | HEART RATE: 85 BPM | OXYGEN SATURATION: 97 % | HEIGHT: 61 IN | DIASTOLIC BLOOD PRESSURE: 88 MMHG

## 2025-01-17 DIAGNOSIS — Z23 IMMUNIZATION DUE: Primary | ICD-10-CM

## 2025-01-17 DIAGNOSIS — J45.21 MILD INTERMITTENT ASTHMATIC BRONCHITIS WITH ACUTE EXACERBATION (HHS-HCC): ICD-10-CM

## 2025-01-17 PROCEDURE — 1123F ACP DISCUSS/DSCN MKR DOCD: CPT | Performed by: INTERNAL MEDICINE

## 2025-01-17 PROCEDURE — 1159F MED LIST DOCD IN RCRD: CPT | Performed by: INTERNAL MEDICINE

## 2025-01-17 PROCEDURE — 3079F DIAST BP 80-89 MM HG: CPT | Performed by: INTERNAL MEDICINE

## 2025-01-17 PROCEDURE — 1036F TOBACCO NON-USER: CPT | Performed by: INTERNAL MEDICINE

## 2025-01-17 PROCEDURE — G2211 COMPLEX E/M VISIT ADD ON: HCPCS | Performed by: INTERNAL MEDICINE

## 2025-01-17 PROCEDURE — 1160F RVW MEDS BY RX/DR IN RCRD: CPT | Performed by: INTERNAL MEDICINE

## 2025-01-17 PROCEDURE — 99214 OFFICE O/P EST MOD 30 MIN: CPT | Performed by: INTERNAL MEDICINE

## 2025-01-17 PROCEDURE — 3050F LDL-C >= 130 MG/DL: CPT | Performed by: INTERNAL MEDICINE

## 2025-01-17 PROCEDURE — 3075F SYST BP GE 130 - 139MM HG: CPT | Performed by: INTERNAL MEDICINE

## 2025-01-17 RX ORDER — CEFDINIR 300 MG/1
300 CAPSULE ORAL 2 TIMES DAILY
Qty: 20 CAPSULE | Refills: 0 | Status: SHIPPED | OUTPATIENT
Start: 2025-01-17 | End: 2025-01-27

## 2025-01-17 RX ORDER — PREDNISONE 10 MG/1
TABLET ORAL
Qty: 20 TABLET | Refills: 0 | Status: SHIPPED | OUTPATIENT
Start: 2025-01-17

## 2025-01-17 ASSESSMENT — PATIENT HEALTH QUESTIONNAIRE - PHQ9
2. FEELING DOWN, DEPRESSED OR HOPELESS: NOT AT ALL
1. LITTLE INTEREST OR PLEASURE IN DOING THINGS: NOT AT ALL
SUM OF ALL RESPONSES TO PHQ9 QUESTIONS 1 AND 2: 0

## 2025-01-17 ASSESSMENT — ENCOUNTER SYMPTOMS: COUGH: 1

## 2025-01-17 NOTE — PROGRESS NOTES
"Subjective   Patient ID: Yoselyn Montana is a 74 y.o. female who presents for Cough (And crackling ).    Here for follow-up  Recently did labs  Saw her endocrinologist this week-Mounjaro started in place of Trulicity which was not covered  Over the last 10 days she has developed upper respiratory illness, with the last 3 days more congestion postnasal drip  No fevers no chills no intestinal symptoms  Throat is scratchy  No ear symptoms    Cough         Review of Systems   Respiratory:  Positive for cough.        Objective   /88 (BP Location: Left arm, Patient Position: Sitting, BP Cuff Size: Large adult)   Pulse 85   Ht 1.549 m (5' 1\")   Wt 92 kg (202 lb 12.8 oz)   SpO2 97%   BMI 38.32 kg/m²     Physical Exam  Constitutional:       Comments: This is a well-developed patient, a bit congested, in no respiratory distress  Eye exam revealed pupils equally round and reactive, with extraocular muscles intact. Normal sclera and eyelids.  Minimal sinus pressure  Ear exam without pain on palpation. Otic canal without obvious tympanic membrane erythema or air-fluid levels.  Oral exam revealed unremarkable lips, pharynx and palate. No obvious ulcers, exudates or lesions noted.  Neck exam revealed no masses, adenopathy or thyromegaly. No neck pain on range of motion was noted.  Pulmonary exam revealed clear breath sounds bilaterally in all lung fields. No wheezes bronchitis or rales noted.  Cardiac exam revealed regular rate and rhythm without murmurs gallops or rubs.  Skin exam without any rash  On mental status exam, judgment and insight appear intact. Alert and oriented x3. No apparent mood or cognitive impairment. Affect was bright, without evidence of depression or anxiety.         Assessment/Plan   Problem List Items Addressed This Visit    None  Visit Diagnoses         Codes    Immunization due    -  Primary Z23    Relevant Medications    respiratory syncytial virus, RSV, vaccine, adjuvanted, age 60y+ (Arexvy) " 120 mcg/0.5 mL suspension for reconstitution    Mild intermittent asthmatic bronchitis with acute exacerbation ([facility])     J45.21    Relevant Medications    predniSONE (Deltasone) 10 mg tablet    cefdinir (Omnicef) 300 mg capsule               Portions of this encounter note have been copied from my previous note dated 24 , which have been updated where appropriate and all reflect my current medical decision making from today.     Labs reviewed from 1/15/2025 and 2025    Asthmatic bronchitis flare-        2024-she is using Robitussin DM-intolerant to Mucinex.  She will continue.  She will restart her Ventolin MDI and use caution with the upcoming polar winter weather predicted next week.  She will use the antibiotic-Omnicef with a probiotic as well as the prednisone taper and follow-up if not improved      Bereavement/history of anxiety-prior long-term  and fiancé, brother passed away spring 2024.  She is off the Xanax.  She is remains on sertraline but only half pill daily.  She was able to visit her daughter in the [facility] for a month after the .  She feels things are going in the right direction and remains positive.  Support provided             -doing better all things considered.  Her kids have been helpful.  She is off the Zoloft and does not feel she needs this.  She will continue her exercise routine            -looking forward to going to [place] for 3 weeks later this month, and then to [email or web address] this summer.      Left third finger soft tissue swelling and pain-over several weeks in 2024-history and exam nondiagnostic.with her antibiotic sensitivity she will use a Z-Wiliam as directed and set [facility] an [facility]           -she has met Dr. Guzman, and will see him back in 6 weeks.  Left middle finger still does not flex easily without pain.  She has explored options such as gel injections and  PRP.      Elevated 10 year cardiac risk assessment- 22% early October 2019- she declined medication for cholesterol instead opting for carbohydrate restricted diet and weight loss efforts.   Presently tolerating pravastatin. She will continue diabetic efforts now with endocrinologist and blood pressure plan and the baby aspirin daily               Encouraged her to restart taking the baby aspirin daily along with her pravastatin and carvedilol     Diabetes- She has established with endocrinologist in early 2022- she saw her back in May 2022. A1c was 7.4%-now 7.6%. The plan to decide late summer in August whether or what medicine to start-either daily pills or a weekly shot she states. August '22 A1c 7.9%. Trulicity started. She'll see her back in Jan '23. A1c 7% Jan '23. She will continue her medication and diabetic plan-overall improved              She will follow-up with her endocrinologist, Dr. Manley regularly as directed           12/24-she is off Ozempic and back on Trulicity.  She will follow-up with her in the new year as scheduled.  She will continue exercise and weight loss efforts accordingly           1/25 - recent visit w/ Dr. Tyson prieto - Trulicity changed to Mounjaro.  A1c increased a bit to 7.2%-attributed to his steroid use     Hypertension/edema-blood pressure improved            12/24-blood pressure improved on recheck             1/25-blood pressure borderline elevated she will continue exercise weight loss of medications    dyslipidemia- she will continue her weight loss efforts. She has stopped her statin in the past. Presently back on pravastatin.  Goal LDL under 70.             LDL January 2023-103 on pravastatin            7/24-she is using atorvastatin every other night-she will check lipids.  She felt it caused effects taking it nightly.             1/25-.  She is intolerant to more pravastatin.        Exercise routine- swimming at [facility] center 3 x wk. in the pool an hour w/ a  water aerobic class. suggested 4. Just restarted walking last wk - plans to advance a block each week. encouraged indoor walking in weather. discussed cross training.   Encouraged swimming in the am, and walking before              Once  she gets some help, she hopes to get back to once she gets some help, she hopes to get back to swimming 1 hour 3 times weekly            12/24-she is swimming             1/25-strongly encouraged crosstraining routine-swimming and recumbent cycle and treadmill and walking.  She is going to [place] for 3 weeks to care for the dogs later this month.  She will do more outside walking there.  She gets frustrated going to [place] in [place] where her cousins have more stamina.  I reminded her that [place] for and over there they do more walking every day.  She had she does do a [place] a once a month, where she does more walking than 1 day.  Encouraged her to make a goal to do that once a week so that she will be in better shape when she goes to [place] this summer.     BMI over 35- obesity with comorbid conditions-diabetes hypertension dyslipidemia- stable--we spoke about options. She doesn't really feel Weight Watchers in a classroom setting would be ideal for her. She has tried various diets including Weight Watchers and her own supplements. Clearly she needs to focus more on her own health.   Encouraged morning walking routine, 30 minutes 6 days weekly as her goal.presently she is swimming at the pool. She has met with a nutritional specialist. She will continue efforts on diet intake and exercise efforts    Unfortunately, her BMI has increased to 38. She is frustrated with this as nothing seems to work to lose weight. Will continue encourage efforts. Hopefully w/ Trulicity, she'll loose 10 lbs as her endocrinologist said            6/23-unfortunately her weight did not drop with the Trulicity.  She will review with her endocrinologist in July.  BMI remains at 37               7/24-BMI 36.4.  She will continue her medications and fitness and weight loss efforts           12/24-BMI 37.4.  She will continue diet and exercise efforts as above              1/25-BMI 38.3.  Mounjaro beginning with her endocrinologist.     Allergies / Hx asthma / history of eustachian tube dysfunction/tinnitus - she will continue medications accordingly as above   More congestion this winter-years ago she was on allergy shots-encouraged her to get back in for an allergy evaluation with Dr. Cooley           1/25-encouraged Flonase until improved    Polypharmacy-she is on multiple vitamins-at this point it is unclear whether they are beneficial-she will stop the vitamin C, magnesium, CoQ10, multivitamin and acetylcysteine and her sinechrome     Phlegm- she will start a new drug called Viiti supplement and we will redo labs at next visit.      Left middle finger nail trauma- ecchymoses after she got this caught the door. As it turns out she was injured as a child with with this fingertip     Annual mammograms- mammogram updated October 2024         Gynecologic care-she will follow up with her gynecologist regularly. She will est. sb/ Dr. Alfred after her GYN retired            She saw her in 12/23 and we will see her again this year    Occasional back spasms-she will pursue physical therapy, and her water stretches    Right shoulder arthritis-advanced-she has seen Dr. Edgardo Dash who told her she will eventually need surgery.  X-rays completed 7/24.  Her last shot did not last more than a week or so.  Limited options.  She will use caution with swimming but at this point she does not want to pursue shoulder surgery and plans to put this off          She plans to [place] back with Dr. Dash when she wants her shoulder replaced     Left knee pain- improved with swimming before the pandemic. Encouraged walking, weight loss and Tylenol arthritis 652 pills twice daily as needed     Hand arthritis- she will  limit overuse, and use the Tylenol arthritis     Colon polyps- colonoscopy updated January 2018 per Dr.. Melissa Templeton, Recommended 5 years-January 2023.               Colonoscopy 8/23 with several polyps-next colonoscopy 3 years-8/26    Gastric polyp / occas GERD - noted on EGD. she'll follow per Dr.. Templeton as needed. no longer on PPI. EGD 1/18 unremarkable             With her family history of gastric cancer she plans to do an EGD soon in 2023.  She plans to add this on this summer            8/23-EGD unremarkable.     History of left hemithyroidectomy- around 2000, we'll continue to follow TFTs at least annually.     Vitamin D deficiency-she discontinued this sating headaches when she took vitamin D.   Recent vitamin D 12/21- low- she will advance this.   She will start an equivalent of 125 mg of Vitamin D daily with Viiti supplement,      Hx Osteopenia- bone density normal October 2020-     Right shoulder pain-normal range of motion but slight bicep pain proximally after pushing her father's wheelchair a pill. At this point she doesn't feel further consultation is warranted. Less of an issue of late. Stable     Globus sensation-mainly noticeable with difficult stress. Negative EGD in the past per GI. Resolves with alprazolam suggesting stress response. She will obviously follow-up with Dr. Melissa Templeton in GI with any concerns        History of eczema- dermatology medications refilled as she does not plan to follow-up with dermatology at this point.   Encouraged consistent moisturizer use such as Cetaphil     Blepharitis- status post visit with Dr. Womack. She will use the baby shampoo as well as her lites. Improved of late     Tinnitus - bilateral-worse w/ stress. supplements not much better. Once again encouraged her to focus on allergy control     Vision exams-she will continue visits annually with Dr. Cain. At her January 2023 visit-early cataracts noted.  No diabetic eye concerns on her 2/24  visit.               She will continue to see him annually     Dental care-encouraged semiannual dental visits.         Encouraged her to update her living will last time       Flu shot each fall- updated 10/24    Covid booster - declined    RSV vacc - encouraged 1/25     Shingrix series completed     She will follow-up in 5 months, sooner as needed.     Charting was completed using voice recognition technology and may include unintended errors.

## 2025-01-21 ENCOUNTER — APPOINTMENT (OUTPATIENT)
Dept: PRIMARY CARE | Facility: CLINIC | Age: 75
End: 2025-01-21
Payer: MEDICARE

## 2025-02-06 ENCOUNTER — APPOINTMENT (OUTPATIENT)
Dept: ENDOCRINOLOGY | Facility: CLINIC | Age: 75
End: 2025-02-06
Payer: MEDICARE

## 2025-02-11 ENCOUNTER — APPOINTMENT (OUTPATIENT)
Dept: OPHTHALMOLOGY | Facility: CLINIC | Age: 75
End: 2025-02-11
Payer: MEDICARE

## 2025-02-27 ENCOUNTER — APPOINTMENT (OUTPATIENT)
Dept: ORTHOPEDIC SURGERY | Facility: CLINIC | Age: 75
End: 2025-02-27
Payer: MEDICARE

## 2025-03-10 ENCOUNTER — HOSPITAL ENCOUNTER (OUTPATIENT)
Dept: RADIOLOGY | Facility: CLINIC | Age: 75
Discharge: HOME | End: 2025-03-10
Payer: MEDICARE

## 2025-03-10 ENCOUNTER — OFFICE VISIT (OUTPATIENT)
Dept: ORTHOPEDIC SURGERY | Facility: CLINIC | Age: 75
End: 2025-03-10
Payer: MEDICARE

## 2025-03-10 DIAGNOSIS — M19.019 GLENOHUMERAL ARTHRITIS: ICD-10-CM

## 2025-03-10 DIAGNOSIS — M25.511 ACUTE PAIN OF RIGHT SHOULDER: ICD-10-CM

## 2025-03-10 PROCEDURE — 99215 OFFICE O/P EST HI 40 MIN: CPT | Performed by: ORTHOPAEDIC SURGERY

## 2025-03-10 PROCEDURE — 99213 OFFICE O/P EST LOW 20 MIN: CPT | Performed by: ORTHOPAEDIC SURGERY

## 2025-03-10 PROCEDURE — 73030 X-RAY EXAM OF SHOULDER: CPT | Mod: RT

## 2025-03-10 PROCEDURE — 1123F ACP DISCUSS/DSCN MKR DOCD: CPT | Performed by: ORTHOPAEDIC SURGERY

## 2025-03-10 PROCEDURE — 3050F LDL-C >= 130 MG/DL: CPT | Performed by: ORTHOPAEDIC SURGERY

## 2025-03-10 PROCEDURE — 73030 X-RAY EXAM OF SHOULDER: CPT | Mod: RIGHT SIDE | Performed by: ORTHOPAEDIC SURGERY

## 2025-03-10 RX ORDER — CYCLOBENZAPRINE HCL 5 MG
5 TABLET ORAL NIGHTLY PRN
Qty: 10 TABLET | Refills: 0 | Status: SHIPPED | OUTPATIENT
Start: 2025-03-10 | End: 2025-03-20

## 2025-03-10 NOTE — PROGRESS NOTES
History of present illness: Known right shoulder osteoarthritis progressing in nature    She had a fluoroscopy guided injection October but with severe pain grinding crepitus pain with daily activities she is seeking shoulder arthroplasty    Physical exam:    General: No acute distress or breathing difficulty or discomfort, pleasant and cooperative with the examination.    Extremities: The right shoulder was inspected and was found to have no obvious deformity.  There was tenderness to touch over the lateral edges of the shoulder over the rotator cuff insertion.  Active forward flexion 110 degrees, external rotation to 50 degrees, abduction to 45 degrees, and internal rotation to the level of L2.    At this time the shoulder is neurovascular tact and neurosensory intact.  Motor intact C5-T1.  There was no obvious neck pain or radiculopathy noted.  There was no gross instability or adhesive capsulitis symptoms.  There was no evidence of apprehension or apprehension suppression.    Strength was tested in 4 planes with weakness in the supraspinatus strength testing and external rotation position.  There was no strength deficit in internal rotation.  Impingement signs were positive both supine and standing for impingement test type I and II.  There was mild pain over the bicipital groove with a positive speeds sign    Diagnostic studies: X-rays show advanced arthritic change right glenohumeral joint    Impression: Right shoulder advanced arthritis    Plan: Now for shoulder replacement in June 2025    PT therapy Rehab program discussed    Realistic goals and expectations discussed    Surgical risk risk and benefits of surgery discussed extensively with the patient.    Surgical risk included but were not limited to infection, wear, loosening, need for further surgery blood clot, failure to heal, failure of the surgery, stiffness, loss of limb life, extremity function change in length change, and associated risks of  any  surgery.    Patient does get difficulty with sleeping at night she does not want anything strong we gave her a very low-dose Flexeril 5 mg p.o. nightly to try in the evening

## 2025-03-25 ENCOUNTER — OFFICE VISIT (OUTPATIENT)
Dept: ORTHOPEDIC SURGERY | Facility: CLINIC | Age: 75
End: 2025-03-25
Payer: MEDICARE

## 2025-03-25 DIAGNOSIS — M19.042 ARTHRITIS OF FINGER OF LEFT HAND: ICD-10-CM

## 2025-03-25 DIAGNOSIS — M25.442 FINGER JOINT SWELLING, LEFT: Primary | ICD-10-CM

## 2025-03-25 PROCEDURE — 1036F TOBACCO NON-USER: CPT | Performed by: ORTHOPAEDIC SURGERY

## 2025-03-25 PROCEDURE — 1159F MED LIST DOCD IN RCRD: CPT | Performed by: ORTHOPAEDIC SURGERY

## 2025-03-25 PROCEDURE — 1123F ACP DISCUSS/DSCN MKR DOCD: CPT | Performed by: ORTHOPAEDIC SURGERY

## 2025-03-25 PROCEDURE — 99213 OFFICE O/P EST LOW 20 MIN: CPT | Performed by: ORTHOPAEDIC SURGERY

## 2025-03-25 PROCEDURE — 99214 OFFICE O/P EST MOD 30 MIN: CPT | Performed by: ORTHOPAEDIC SURGERY

## 2025-03-25 NOTE — PROGRESS NOTES
3/25/2025    Chief Complaint   Patient presents with    Left Middle Finger - Osteoarthritis     PIP joint arthritis s/p inj 1/14/25       History of Present Illness:  Patient Yoselyn Montana , 75 y.o. female, presents today, 3/25/2025, for evaluation of left middle finger pain and swelling.  She underwent injection to left long finger PIP arthritis in January.  She states she got little in the way of relief overall.  Still has a moderate amount of swelling and discomfort on a daily basis.  She has upcoming shoulder surgery for the right side and is inquiring if there are any bracing options to offer her additional support with use of this left hand moving forward.  She takes Tylenol and over-the-counter anti-inflammatories at baseline.  She is considering surgical intervention but would not be able to do this on the left side now she has upcoming surgery on the right.       Review of Systems:   GENERAL: Negative  GI: Negative  MUSCULOSKELETAL: See HPI  SKIN: Negative  NEURO:  Negative     Physical Exam:  GENERAL:  Alert and oriented to person, place, and time.  No acute distress and breathing comfortably; pleasant and cooperative with the examination.  HEENT:  Head is normocephalic and atraumatic.  NECK:  Supple, no visible swelling.  CARDIOVASCULAR:  No palpable tachycardia.  LUNGS:  No audible wheezing or labored breathing.  ABDOMEN:  Nondistended.  Extremities: Evaluation of left upper extremity finds the patient to have a palpable radial artery at the wrist with brisk capillary refill to all digits. The patient has intact sensorium to axillary, radial, median and ulnar nerves. There are no open wounds. There are no signs of infection. There is no evidence of lymphedema or lymphatic streaking. The patient has supple compartments of the left arm, forearm and hand.  Few swelling along the length of the digit.  She is exquisitely tender palpation over the proximal interphalangeal joint.  She has active extension  to 0 degrees she can only flex about 45 degrees.     Imaging/Test Results:  None today.     Assessment:  Left long finger proximal interphalangeal joint arthritis.     Plan:  Treatment options including operative and nonoperative strategies were reviewed.  Patient strong preference for continued nonoperative management at this time.  We did discuss intermittent use of Coban wrap to help minimize swelling.  We will give her oval 8 splint to wear for support with activities but we discouraged her from wearing it all the time as we discussed that long-term use would increase her extension contracture and stiffness.  Patient verbalized agreement, understanding, plan for care.  For now she elects for observation, bracing, and over-the-counter analgesics as needed and follow-up again in September for repeat clinical exam.  No x-rays necessary upon return.  All questions answered today's visit.    In a face to face encounter, I performed a history and physical examination, discussed pertinent diagnostic studies if indicated, and discussed diagnosis and management strategies with both the patient and the mid-level provider. I reviewed the mid-level's note and agree with the documented findings and plan of care.  Patient presents today for evaluation of the left long finger.  She describes little to no benefit with the intra-articular steroid injection we did at last visit.  She localizes pain and stiffness to left long finger at PIP joint.  She ranges to approximately 50 degrees flexion from full extension.  Treatment options were discussed.  She elects for intermittent splinting for symptomatic relief only when she is using the hand for aggressive activities.  We explained to the patient that persistent splinting will worsen extension contracture.  Follow-up with me in September to discuss ongoing strategies including silicone arthroplasty versus fusion versus repeat trial of steroid injection.

## 2025-04-12 DIAGNOSIS — I10 ESSENTIAL (PRIMARY) HYPERTENSION: ICD-10-CM

## 2025-04-13 RX ORDER — CARVEDILOL 25 MG/1
TABLET ORAL
Qty: 180 TABLET | Refills: 3 | Status: SHIPPED | OUTPATIENT
Start: 2025-04-13

## 2025-04-13 RX ORDER — POTASSIUM CHLORIDE 750 MG/1
TABLET, FILM COATED, EXTENDED RELEASE ORAL
Qty: 90 TABLET | Refills: 3 | Status: SHIPPED | OUTPATIENT
Start: 2025-04-13

## 2025-04-17 ENCOUNTER — TELEPHONE (OUTPATIENT)
Dept: ORTHOPEDIC SURGERY | Facility: CLINIC | Age: 75
End: 2025-04-17
Payer: MEDICARE

## 2025-04-17 ENCOUNTER — APPOINTMENT (OUTPATIENT)
Dept: ORTHOPEDIC SURGERY | Facility: CLINIC | Age: 75
End: 2025-04-17
Payer: MEDICARE

## 2025-04-17 NOTE — TELEPHONE ENCOUNTER
04/17/25  Pt LVM on DME line that she lost one of her finger braces and wanted to stop in and get a new one.  I spoke w/ Sailaja, Dr. Guzman's MA, and she said the pt can come in and check in at the  and tell them she is here for a splint.  She may then be seen in the injury clinic.

## 2025-05-20 ENCOUNTER — APPOINTMENT (OUTPATIENT)
Dept: OPHTHALMOLOGY | Facility: CLINIC | Age: 75
End: 2025-05-20
Payer: MEDICARE

## 2025-05-20 DIAGNOSIS — E11.65 TYPE 2 DIABETES MELLITUS WITH HYPERGLYCEMIA, WITHOUT LONG-TERM CURRENT USE OF INSULIN: ICD-10-CM

## 2025-05-20 DIAGNOSIS — H52.4 HYPEROPIA OF BOTH EYES WITH ASTIGMATISM AND PRESBYOPIA: Primary | ICD-10-CM

## 2025-05-20 DIAGNOSIS — H52.03 HYPEROPIA OF BOTH EYES WITH ASTIGMATISM AND PRESBYOPIA: Primary | ICD-10-CM

## 2025-05-20 DIAGNOSIS — H25.813 COMBINED FORMS OF AGE-RELATED CATARACT OF BOTH EYES: ICD-10-CM

## 2025-05-20 DIAGNOSIS — H52.203 HYPEROPIA OF BOTH EYES WITH ASTIGMATISM AND PRESBYOPIA: Primary | ICD-10-CM

## 2025-05-20 PROCEDURE — 92015 DETERMINE REFRACTIVE STATE: CPT | Performed by: OPTOMETRIST

## 2025-05-20 PROCEDURE — 92014 COMPRE OPH EXAM EST PT 1/>: CPT | Performed by: OPTOMETRIST

## 2025-05-20 ASSESSMENT — ENCOUNTER SYMPTOMS
CARDIOVASCULAR NEGATIVE: 0
PSYCHIATRIC NEGATIVE: 0
MUSCULOSKELETAL NEGATIVE: 0
ALLERGIC/IMMUNOLOGIC NEGATIVE: 0
GASTROINTESTINAL NEGATIVE: 0
CONSTITUTIONAL NEGATIVE: 0
HEMATOLOGIC/LYMPHATIC NEGATIVE: 0
RESPIRATORY NEGATIVE: 0
EYES NEGATIVE: 1
ENDOCRINE NEGATIVE: 0
NEUROLOGICAL NEGATIVE: 0

## 2025-05-20 ASSESSMENT — REFRACTION_MANIFEST
OS_AXIS: 100
OD_ADD: +2.50
OS_CYLINDER: -0.75
OS_ADD: +2.50
OD_CYLINDER: -0.50
OD_AXIS: 080
OD_SPHERE: +3.50
OS_SPHERE: +2.25

## 2025-05-20 ASSESSMENT — REFRACTION_WEARINGRX
OS_CYLINDER: SPHERE
OS_SPHERE: +2.25
OD_SPHERE: +3.25
OD_CYLINDER: SPHERE
SPECS_TYPE: DISTANCE ONLY

## 2025-05-20 ASSESSMENT — CONF VISUAL FIELD
OS_SUPERIOR_NASAL_RESTRICTION: 0
OD_INFERIOR_TEMPORAL_RESTRICTION: 0
OS_NORMAL: 1
OD_SUPERIOR_TEMPORAL_RESTRICTION: 0
OD_INFERIOR_NASAL_RESTRICTION: 0
OD_SUPERIOR_NASAL_RESTRICTION: 0
OS_INFERIOR_NASAL_RESTRICTION: 0
METHOD: COUNTING FINGERS
OS_SUPERIOR_TEMPORAL_RESTRICTION: 0
OS_INFERIOR_TEMPORAL_RESTRICTION: 0
OD_NORMAL: 1

## 2025-05-20 ASSESSMENT — EXTERNAL EXAM - LEFT EYE: OS_EXAM: NORMAL

## 2025-05-20 ASSESSMENT — CUP TO DISC RATIO
OD_RATIO: .3
OS_RATIO: .3

## 2025-05-20 ASSESSMENT — VISUAL ACUITY
METHOD: SNELLEN - LINEAR
OS_BAT_MED: 20/25-2
CORRECTION_TYPE: GLASSES
OS_CC: 20/25
OD_BAT_MED: 20/30-1
OD_CC: 20/20-2

## 2025-05-20 ASSESSMENT — TONOMETRY
IOP_METHOD: GOLDMANN APPLANATION
OD_IOP_MMHG: 17
OS_IOP_MMHG: 17

## 2025-05-20 ASSESSMENT — EXTERNAL EXAM - RIGHT EYE: OD_EXAM: NORMAL

## 2025-05-20 NOTE — PROGRESS NOTES
A spectacle prescription was dispensed to be used as needed. Minor change.     DMII and no retinopathy. I discussed the value of good blood sugar control under your management and annual eye exams.     PVD OU.     Mild cataracts.     On prednisone therapy shots and pills for shoulder pain, and pneumonia. IOP is normal range.     VA corrects to 20/20 20/20 OD/OS. 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     The patient was asked to return to our clinic in one year or sooner if ocular or vision changes occur.

## 2025-05-22 ENCOUNTER — TELEPHONE (OUTPATIENT)
Dept: ORTHOPEDIC SURGERY | Facility: CLINIC | Age: 75
End: 2025-05-22
Payer: MEDICARE

## 2025-05-22 NOTE — TELEPHONE ENCOUNTER
Patient LVM and wants to know if she can have toenail polish on for her surgery. Also that her daughter Yecenia Gomez is her caregiver for 2 weeks and has been called to jury duty. She wants to know if she can have letter stating that she's having surgery and needs her daughter as the care giver

## 2025-05-26 DIAGNOSIS — Z01.818 PRE-OP TESTING: ICD-10-CM

## 2025-06-02 ENCOUNTER — LAB (OUTPATIENT)
Dept: LAB | Facility: HOSPITAL | Age: 75
End: 2025-06-02
Payer: MEDICARE

## 2025-06-02 ENCOUNTER — HOSPITAL ENCOUNTER (OUTPATIENT)
Dept: CARDIOLOGY | Facility: HOSPITAL | Age: 75
Discharge: HOME | End: 2025-06-02
Payer: MEDICARE

## 2025-06-02 DIAGNOSIS — Z01.818 PRE-OP TESTING: ICD-10-CM

## 2025-06-02 LAB
ALBUMIN SERPL BCP-MCNC: 4.4 G/DL (ref 3.4–5)
ALP SERPL-CCNC: 38 U/L (ref 33–136)
ALT SERPL W P-5'-P-CCNC: 16 U/L (ref 7–45)
ANION GAP SERPL CALC-SCNC: 12 MMOL/L (ref 10–20)
AST SERPL W P-5'-P-CCNC: 16 U/L (ref 9–39)
ATRIAL RATE: 68 BPM
BASOPHILS # BLD AUTO: 0.03 X10*3/UL (ref 0–0.1)
BASOPHILS NFR BLD AUTO: 0.4 %
BILIRUB SERPL-MCNC: 0.4 MG/DL (ref 0–1.2)
BUN SERPL-MCNC: 29 MG/DL (ref 6–23)
CALCIUM SERPL-MCNC: 9.1 MG/DL (ref 8.6–10.3)
CHLORIDE SERPL-SCNC: 103 MMOL/L (ref 98–107)
CO2 SERPL-SCNC: 28 MMOL/L (ref 21–32)
CREAT SERPL-MCNC: 0.95 MG/DL (ref 0.5–1.05)
EGFRCR SERPLBLD CKD-EPI 2021: 62 ML/MIN/1.73M*2
EOSINOPHIL # BLD AUTO: 0.28 X10*3/UL (ref 0–0.4)
EOSINOPHIL NFR BLD AUTO: 3.9 %
ERYTHROCYTE [DISTWIDTH] IN BLOOD BY AUTOMATED COUNT: 11.9 % (ref 11.5–14.5)
GLUCOSE SERPL-MCNC: 133 MG/DL (ref 74–99)
HCT VFR BLD AUTO: 39.8 % (ref 36–46)
HGB BLD-MCNC: 13.3 G/DL (ref 12–16)
IMM GRANULOCYTES # BLD AUTO: 0.01 X10*3/UL (ref 0–0.5)
IMM GRANULOCYTES NFR BLD AUTO: 0.1 % (ref 0–0.9)
INR PPP: 1 (ref 0.9–1.1)
LYMPHOCYTES # BLD AUTO: 1.97 X10*3/UL (ref 0.8–3)
LYMPHOCYTES NFR BLD AUTO: 27.5 %
MCH RBC QN AUTO: 28.7 PG (ref 26–34)
MCHC RBC AUTO-ENTMCNC: 33.4 G/DL (ref 32–36)
MCV RBC AUTO: 86 FL (ref 80–100)
MONOCYTES # BLD AUTO: 0.45 X10*3/UL (ref 0.05–0.8)
MONOCYTES NFR BLD AUTO: 6.3 %
NEUTROPHILS # BLD AUTO: 4.42 X10*3/UL (ref 1.6–5.5)
NEUTROPHILS NFR BLD AUTO: 61.8 %
NRBC BLD-RTO: 0 /100 WBCS (ref 0–0)
P AXIS: 29 DEGREES
P OFFSET: 192 MS
P ONSET: 144 MS
PLATELET # BLD AUTO: 260 X10*3/UL (ref 150–450)
POTASSIUM SERPL-SCNC: 4.8 MMOL/L (ref 3.5–5.3)
PR INTERVAL: 168 MS
PROT SERPL-MCNC: 6.5 G/DL (ref 6.4–8.2)
PROTHROMBIN TIME: 11.2 SECONDS (ref 9.8–12.4)
Q ONSET: 228 MS
QRS COUNT: 11 BEATS
QRS DURATION: 68 MS
QT INTERVAL: 398 MS
QTC CALCULATION(BAZETT): 423 MS
QTC FREDERICIA: 415 MS
R AXIS: -8 DEGREES
RBC # BLD AUTO: 4.63 X10*6/UL (ref 4–5.2)
SODIUM SERPL-SCNC: 138 MMOL/L (ref 136–145)
T AXIS: 47 DEGREES
T OFFSET: 427 MS
VENTRICULAR RATE: 68 BPM
WBC # BLD AUTO: 7.2 X10*3/UL (ref 4.4–11.3)

## 2025-06-02 PROCEDURE — 85610 PROTHROMBIN TIME: CPT

## 2025-06-02 PROCEDURE — 80053 COMPREHEN METABOLIC PANEL: CPT

## 2025-06-02 PROCEDURE — 85025 COMPLETE CBC W/AUTO DIFF WBC: CPT

## 2025-06-02 PROCEDURE — 93005 ELECTROCARDIOGRAM TRACING: CPT

## 2025-06-06 ENCOUNTER — APPOINTMENT (OUTPATIENT)
Dept: PRIMARY CARE | Facility: CLINIC | Age: 75
End: 2025-06-06
Payer: MEDICARE

## 2025-06-06 VITALS
SYSTOLIC BLOOD PRESSURE: 126 MMHG | WEIGHT: 190 LBS | OXYGEN SATURATION: 96 % | DIASTOLIC BLOOD PRESSURE: 85 MMHG | BODY MASS INDEX: 35.87 KG/M2 | HEART RATE: 73 BPM | HEIGHT: 61 IN

## 2025-06-06 DIAGNOSIS — Z12.31 ENCOUNTER FOR SCREENING MAMMOGRAM FOR BREAST CANCER: Primary | ICD-10-CM

## 2025-06-06 PROCEDURE — G2211 COMPLEX E/M VISIT ADD ON: HCPCS | Performed by: INTERNAL MEDICINE

## 2025-06-06 PROCEDURE — 1160F RVW MEDS BY RX/DR IN RCRD: CPT | Performed by: INTERNAL MEDICINE

## 2025-06-06 PROCEDURE — 3074F SYST BP LT 130 MM HG: CPT | Performed by: INTERNAL MEDICINE

## 2025-06-06 PROCEDURE — 1159F MED LIST DOCD IN RCRD: CPT | Performed by: INTERNAL MEDICINE

## 2025-06-06 PROCEDURE — 99214 OFFICE O/P EST MOD 30 MIN: CPT | Performed by: INTERNAL MEDICINE

## 2025-06-06 PROCEDURE — 3079F DIAST BP 80-89 MM HG: CPT | Performed by: INTERNAL MEDICINE

## 2025-06-06 ASSESSMENT — PATIENT HEALTH QUESTIONNAIRE - PHQ9
2. FEELING DOWN, DEPRESSED OR HOPELESS: NOT AT ALL
SUM OF ALL RESPONSES TO PHQ9 QUESTIONS 1 AND 2: 0
1. LITTLE INTEREST OR PLEASURE IN DOING THINGS: NOT AT ALL

## 2025-06-06 NOTE — PROGRESS NOTES
"Subjective   Patient ID: Yoselyn Montana is a 75 y.o. female who presents for Surgery Clearance .    Here for follow-up before upcoming right shoulder surgery.  Scheduled June 17 with Dr. Dash  She has felt well without illnesses or injuries  She continues to follow with her endocrinologist who she saw last week regarding her diabetes  She has been swimming 2 times weekly  She has been on the over-the-counter remedy for left lower eyelid stye which developed a week or so ago-improving presently         Review of Systems    Objective   /85 (BP Location: Left arm, Patient Position: Sitting, BP Cuff Size: Large adult)   Pulse 73   Ht 1.549 m (5' 1\")   Wt 86.2 kg (190 lb)   SpO2 96%   BMI 35.90 kg/m²     Physical Exam  Vitals reviewed.   Constitutional:       Appearance: Normal appearance.   HENT:      Head: Normocephalic and atraumatic.   Eyes:      General: No scleral icterus.        Right eye: No discharge.         Left eye: No discharge.      Extraocular Movements: Extraocular movements intact.      Conjunctiva/sclera: Conjunctivae normal.      Pupils: Pupils are equal, round, and reactive to light.        Comments: Small papule perhaps 1 mm slightly erythematous-without worrisome appearance left lower eyelid-as above.  Remainder of vision exam unremarkable   Cardiovascular:      Rate and Rhythm: Normal rate and regular rhythm.      Pulses: Normal pulses.      Heart sounds: Normal heart sounds. No murmur heard.  Pulmonary:      Effort: Pulmonary effort is normal.      Breath sounds: Normal breath sounds. No wheezing or rhonchi.   Musculoskeletal:         General: No deformity or signs of injury. Normal range of motion.      Cervical back: Normal range of motion and neck supple. No rigidity or tenderness.   Lymphadenopathy:      Cervical: No cervical adenopathy.   Skin:     General: Skin is warm and dry.      Findings: No rash.   Neurological:      General: No focal deficit present.      Mental Status: " She is alert and oriented to person, place, and time. Mental status is at baseline.      Cranial Nerves: No cranial nerve deficit.      Sensory: No sensory deficit.      Gait: Gait normal.   Psychiatric:         Mood and Affect: Mood normal.         Behavior: Behavior normal.         Thought Content: Thought content normal.         Judgment: Judgment normal.         Assessment/Plan   Problem List Items Addressed This Visit    None  Visit Diagnoses         Codes      Encounter for screening mammogram for breast cancer    -  Primary Z12.31    Relevant Orders    BI mammo bilateral screening tomosynthesis               Portions of this encounter note have been copied from my previous note dated 1/17/25 , which have been updated where appropriate and all reflect my current medical decision making from today.     Labs reviewed from 6/2/2025 as well as 5/27/2025  PAT EKG reviewed    Living situation-she is , living alone, with 4 children, and grandchildren.  She remains active and independent,      Advanced right shoulder arthritis-with shoulder [place]17[place]2025 [place] [facility] [place]  In light of her unremarkable history, exam and unremarkable PAT labs, as well as her recent A1c with her endocrinologist and unremarkable PAT EKG, I feel she would be at a low risk for cardiopulmonary complications from the planned procedure.  She will refrain from NSAIDs or aspirin 10 days before surgery and take her blood pressure medicines on the morning of surgery with sips of water.  She will use analgesics as ordered per orthopedics and work with physical therapy afterwards which has already been scheduled.  She will call with additional concerns        Bereavement/history of anxiety-prior long-term  and fiancé,  passed away spring 2024.  She is off the Xanax.  She is remains on sertraline but only half pill daily.  She was able to visit her daughter in the [facility] for a month  after the .  She feels things are going in the right direction and remains positive.  Support provided             -doing better all things considered.  Her kids have been helpful.  She is off the Zoloft and does not feel she needs this.  She will continue her exercise routine            -looking forward to going to [place] for 3 weeks later this month, and then to [facility] this summer.           -overall doing well.  Her kids have been really helpful.  Just enjoyed a trip to [place] with her daughter.  Looking forward to getting to [place] for 4 weeks starting mid October-a trip that she has not done for years.      Elevated 10 year cardiac risk assessment- 22% early 2019- she declined medication for cholesterol instead opting for carbohydrate restricted diet and weight loss efforts.   Presently tolerating pravastatin. She will continue diabetic efforts now with endocrinologist and blood pressure plan and the baby aspirin daily               Encouraged her to restart taking the baby aspirin daily along with her pravastatin and carvedilol     Diabetes- She has established with endocrinologist in early - she saw her back in May 2022. A1c was 7.4%-now 7.6%. The plan to decide late summer in August whether or what medicine to start-either daily pills or a weekly shot she states.  A1c 7.9%. Trulicity started. She'll see her back in . A1c 7% . She will continue her medication and diabetic plan-overall improved              She will follow-up with her endocrinologist, Dr. Manley regularly as directed           -she is off Ozempic and back on Trulicity.  She will follow-up with her in the new year as scheduled.  She will continue exercise and weight loss efforts accordingly            - recent visit w/ Dr. Tyson prieto - Trulicity changed to Mounjaro.  A1c increased a bit to 7.2%-attributed to his steroid use            -she will continue to work with  her endocrinologist, who she sees every 3 to 4 months.  Recent A1c 6.6% late May 2025.  She remains compliant with her diet, exercise routine and medications       Hypertension/edema-blood pressure improved            12/24-blood pressure improved on recheck             1/25-blood pressure borderline elevated she will continue exercise weight loss of medications    dyslipidemia- she will continue her weight loss efforts. She has stopped her statin in the past. Presently back on pravastatin.  Goal LDL under 70.             LDL January 2023-103 on pravastatin            7/24-she is using atorvastatin every other night-she will check lipids.  She felt it caused effects taking it nightly.             1/25-.  She is intolerant to more pravastatin.        Exercise routine- swimming at [facility] center 3 x wk. in the pool an hour w/ a water aerobic class.              1/25-strongly encouraged crosstraining routine-swimming and recumbent cycle and treadmill and walking.  She is going to [place] for 3 weeks to care for the dogs later this month.  She will do more outside walking there.  She gets frustrated going to [employer] in [place] where her cousins have more stamina.  I reminded her that [employer] for and over there they do more walking every day.  She had she does do a [place] a once a month, where she does more walking than 1 day.  Encouraged her to make a goal to do that once a week so that she will be in better shape when she goes to [place] this summer.             6/25-presently swimming 3 times weekly       BMI over 35- obesity with comorbid conditions-diabetes hypertension dyslipidemia- stable--we spoke about options. She doesn't really feel Weight Watchers in a classroom setting would be ideal for her. She has tried various diets including Weight Watchers and her own supplements. Clearly she needs to focus more on her own health.   Encouraged morning walking routine, 30 minutes 6 days weekly as her  goal.presently she is swimming at the pool. She has met with a nutritional specialist. She will continue efforts on diet intake and exercise efforts    Unfortunately, her BMI has increased to 38. She is frustrated with this as nothing seems to work to lose weight. Will continue encourage efforts. Hopefully w/ Trulicity, she'll loose 10 lbs as her endocrinologist said            6/23-unfortunately her weight did not drop with the Trulicity.  She will review with her endocrinologist in July.  BMI remains at 37              7/24-BMI 36.4.  She will continue her medications and fitness and weight loss efforts           12/24-BMI 37.4.  She will continue diet and exercise efforts as above              1/25-BMI 38.3.  Mounjaro beginning with her endocrinologist.              6/25-BMI 35.9.  She remains on Mounjaro with her endocrinologist      Hx Asthmatic bronchitis flare-        1/17/2024-she is using Robitussin DM-intolerant to Mucinex.  She will continue.  She will restart her Ventolin MDI and use caution with the upcoming polar winter weather predicted next week.  She will use the antibiotic-Omnicef with a probiotic as well as the prednisone taper and follow-up if not improved            6/25 improved clinically-she will continue her present medication plan and follow-up promptly with any acute respiratory symptoms.       Allergies / Hx asthma / history of eustachian tube dysfunction/tinnitus - she will continue medications accordingly as above   More congestion this winter-years ago she was on allergy shots-encouraged her to get back in for an allergy evaluation with Dr. Cooley           1/25-encouraged Flonase until improved           6 /25-improved presently    Polypharmacy-she is on multiple vitamins-at this point it is unclear whether they are beneficial-she will stop the vitamin C, magnesium, CoQ10, multivitamin and acetylcysteine and her sinechrome     Phlegm- she will start a new drug called Viiti  supplement and we will redo labs at next visit.      Left middle finger nail trauma- ecchymoses after she got this caught the door. As it turns out she was injured as a child with with this fingertip     Annual mammograms- mammogram updated October 2024              Mammogram ordered for this fall     Gynecologic care-she will follow up with her gynecologist regularly. She will est. sb/ Dr. Alfred after her GYN retired            She saw her in 12/23 and we will see her again this year    Occasional back spasms-she will pursue physical therapy, and her water stretches       Left knee pain- improved with swimming before the pandemic. Encouraged walking, weight loss and Tylenol arthritis 652 pills twice daily as needed           Improved presently-swimming encouraged     Hand arthritis- she will limit overuse, and use the Tylenol arthritis     Colon polyps- colonoscopy updated January 2018 per Dr.. Melissa Templeton, Recommended 5 years-January 2023.               Colonoscopy 8/23 with several polyps-next colonoscopy 3 years-8/26      Gastric polyp / occas GERD - noted on EGD. she'll follow per Dr.. Templeton as needed. no longer on PPI. EGD 1/18 unremarkable             With her family history of gastric cancer she plans to do an EGD soon in 2023.  She plans to add this on this summer            8/23-EGD unremarkable.   she was told no further EGDs  needed      History of left hemithyroidectomy- around 2000, we'll continue to follow TFTs at least annually.           7/24-TSH normal     Vitamin D deficiency-she discontinued this sating headaches when she took vitamin D.   Recent vitamin D 12/21- low- she will advance this.   She will start an equivalent of 125 mg of Vitamin D daily with Viiti supplement,      Hx Osteopenia- bone density normal October 2020-     Right shoulder pain-normal range of motion but slight bicep pain proximally after pushing her father's wheelchair a pill. At this point she doesn't feel further  consultation is warranted. Less of an issue of late. Stable     Globus sensation-mainly noticeable with difficult stress. Negative EGD in the past per GI. Resolves with alprazolam suggesting stress response. She will obviously follow-up with Dr. Melissa Templeton in GI with any concerns        History of eczema- dermatology medications refilled as she does not plan to follow-up with dermatology at this point.   Encouraged consistent moisturizer use such as Cetaphil     Blepharitis- status post visit with Dr. Womack. She will use the baby shampoo as well as her lites. Improved of late     Tinnitus - bilateral-worse w/ stress. supplements not much better. Once again encouraged her to focus on allergy control     Vision exams-she will continue visits annually with Dr. Cain. At her January 2023 visit-early cataracts noted.  No diabetic eye concerns on her 2/24 visit.               She will continue to see him annually    Left lower eyelid stye          6/25-she has been using over-the-counter drops-improved-at this point as this is resolving she can cancel her upcoming optometry appointment       Dental care-encouraged semiannual dental visits.         Encouraged her to update her living will last time       Flu shot each fall- updated 10/24    Covid booster - declined    RSV vacc -3/25     Shingrix series completed     She will follow-up in 6 months, sooner as needed.     Charting was completed using voice recognition technology and may include unintended errors.

## 2025-06-10 ENCOUNTER — APPOINTMENT (OUTPATIENT)
Dept: OPHTHALMOLOGY | Facility: CLINIC | Age: 75
End: 2025-06-10
Payer: MEDICARE

## 2025-06-12 ENCOUNTER — OFFICE VISIT (OUTPATIENT)
Dept: ORTHOPEDIC SURGERY | Facility: CLINIC | Age: 75
End: 2025-06-12
Payer: MEDICARE

## 2025-06-12 DIAGNOSIS — Z96.611 STATUS POST TOTAL SHOULDER REPLACEMENT, RIGHT: ICD-10-CM

## 2025-06-12 DIAGNOSIS — G89.18 ACUTE POSTOPERATIVE PAIN: Primary | ICD-10-CM

## 2025-06-12 DIAGNOSIS — M19.011 PRIMARY OSTEOARTHRITIS OF RIGHT SHOULDER: ICD-10-CM

## 2025-06-12 PROCEDURE — L3670 SO ACRO/CLAV CAN WEB PRE OTS: HCPCS | Performed by: PHYSICIAN ASSISTANT

## 2025-06-12 PROCEDURE — 99212 OFFICE O/P EST SF 10 MIN: CPT | Performed by: PHYSICIAN ASSISTANT

## 2025-06-12 RX ORDER — DOCUSATE SODIUM 100 MG/1
100 CAPSULE, LIQUID FILLED ORAL 2 TIMES DAILY PRN
Qty: 60 CAPSULE | Refills: 0 | Status: SHIPPED | OUTPATIENT
Start: 2025-06-12

## 2025-06-12 RX ORDER — CHLORHEXIDINE GLUCONATE 40 MG/ML
SOLUTION TOPICAL
Qty: 473 ML | Refills: 0 | Status: SHIPPED | OUTPATIENT
Start: 2025-06-12

## 2025-06-12 RX ORDER — CYCLOBENZAPRINE HCL 10 MG
10 TABLET ORAL 3 TIMES DAILY PRN
Qty: 21 TABLET | Refills: 0 | Status: SHIPPED | OUTPATIENT
Start: 2025-06-12 | End: 2025-06-19

## 2025-06-12 RX ORDER — ONDANSETRON 4 MG/1
4 TABLET, FILM COATED ORAL EVERY 8 HOURS PRN
Qty: 20 TABLET | Refills: 0 | Status: SHIPPED | OUTPATIENT
Start: 2025-06-12 | End: 2025-06-19

## 2025-06-12 RX ORDER — ACETAMINOPHEN AND CODEINE PHOSPHATE 300; 30 MG/1; MG/1
1 TABLET ORAL EVERY 6 HOURS PRN
Qty: 28 TABLET | Refills: 0 | Status: SHIPPED | OUTPATIENT
Start: 2025-06-12 | End: 2025-06-19

## 2025-06-12 RX ORDER — CHLORHEXIDINE GLUCONATE ORAL RINSE 1.2 MG/ML
SOLUTION DENTAL
Qty: 30 ML | Refills: 0 | Status: SHIPPED | OUTPATIENT
Start: 2025-06-12

## 2025-06-12 NOTE — PROGRESS NOTES
History and Physical      CHIEF COMPLAINT: Right shoulder pain    HISTORY OF PRESENT ILLNESS:      The patient is a75 y.o. female with significant past medical history of right shoulder pain due to osteoarthritis and rotator cuff tear.  Conservative therapy is not providing relief.  After risk benefits alternatives were discussed patient like to proceed with right reverse total shoulder arthroplasty.      Past Medical History:  Medical History[1]     Past Surgical History:    Surgical History[2]    Medications Prior to Admission:    Medications Ordered Prior to Encounter[3]     Allergies:  Hydrocodone-acetaminophen, Penicillins, Cat dander, Dog dander, Morphine, Simvastatin, and Sulfa (sulfonamide antibiotics)    Social History:   Social History     Socioeconomic History    Marital status:      Spouse name: Not on file    Number of children: Not on file    Years of education: Not on file    Highest education level: Not on file   Occupational History    Not on file   Tobacco Use    Smoking status: Never    Smokeless tobacco: Never   Vaping Use    Vaping status: Never Used   Substance and Sexual Activity    Alcohol use: Not Currently    Drug use: Never    Sexual activity: Not on file   Other Topics Concern    Not on file   Social History Narrative    Not on file     Social Drivers of Health     Financial Resource Strain: Not on file   Food Insecurity: Not on file   Transportation Needs: Not on file   Physical Activity: Not on file   Stress: Not on file   Social Connections: Not on file   Intimate Partner Violence: Not on file   Housing Stability: Not on file       Family History:   Family History[4]     Review of systems: Noncontributory for orthopedics    Vitals:  There were no vitals taken for this visit.    Physical examination:  Head normocephalic atraumatic  Heart regular rate and rhythm  Lungs clear  Abdominal exam nontender nondistended  Extremity: Right shoulder patient has forward flexion to 140  degrees abduction of 70 degrees external rotation of 40 degrees internal rotation posterior iliac crest    Impression : Right shoulder degenerative joint disease and rotator cuff tear      Plan:  Scheduled for right reverse total shoulder arthroplasty    Risk and benefits of surgery discussed extensively with the patient.    Surgical risk included but were not limited to infection, wear, loosening, need for further surgery blood clot, failure to heal, failure of the surgery, stiffness, loss of limb life, extremity function change in length change, and associated risks of surgery during the coronavirus epidemic.    Risk with any surgery including arthroplasty and replacements include but are not limited to:       Wear, loosening, infection, blood clot, DVT, loss of limb, life, delayed recovery, limb length change, instability, dislocation, discomfort with new implant and failure of the procedure.       [1]   Past Medical History:  Diagnosis Date    Cellulitis of right finger 03/24/2016    Paronychia of finger of right hand    Cellulitis, unspecified 11/02/2015    Cellulitis    Chalazion right upper eyelid 12/17/2015    Chalazion of right upper eyelid    Chronic kidney disease, stage 3a (Multi) 01/09/2022    Chronic kidney disease, stage 3a    Chronic kidney disease, stage 3a (Multi) 12/27/2021    Chronic kidney disease, stage 3a    Chronic kidney disease, stage 3a (Multi) 01/09/2022    Chronic kidney disease, stage 3a    Class 2 severe obesity with serious comorbidity and body mass index (BMI) of 38.0 to 38.9 in adult 09/05/2023    Congenital malformation of breast, 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    Breast anomaly    Diabetes mellitus (Multi)     Encounter for general adult medical examination without abnormal findings 02/24/2015    Welcome to Medicare preventive visit    Encounter for screening mammogram for malignant neoplasm of breast 12/06/2016    Other screening mammogram    Epigastric pain     Dyspepsia    Mastodynia  11/07/2020    Nipple pain    Obesity, Class II, BMI 35-39.9 10/22/2021    Other age-related incipient cataract, left eye 06/15/2019    Other age-related incipient cataract of left eye    Other age-related incipient cataract, right eye 06/15/2019    Other age-related incipient cataract of right eye    Other conditions influencing health status     Osteoarthritis    Other specified disorders of eustachian tube, bilateral 12/26/2018    Dysfunction of both eustachian tubes    Personal history of diseases of the skin and subcutaneous tissue     History of contact dermatitis    Personal history of other diseases of the musculoskeletal system and connective tissue 08/07/2020    History of osteopenia    Personal history of other diseases of the nervous system and sense organs 11/02/2015    History of blepharitis    Personal history of other diseases of the nervous system and sense organs 02/03/2018    History of tinnitus    Personal history of other diseases of the respiratory system 11/22/2017    History of sore throat    Personal history of other diseases of the respiratory system     History of acute bronchitis    Personal history of other diseases of the respiratory system 01/13/2016    History of sinusitis    Personal history of other specified conditions 08/07/2018    History of insomnia    Personal history of other specified conditions 06/26/2018    History of insomnia    Prediabetes 10/23/2014    Prediabetes    Prediabetes     Prediabetes    Pruritus, unspecified 09/10/2015    Pruritus of scalp    Rash and other nonspecific skin eruption 09/10/2015    Groin rash    Rash and other nonspecific skin eruption     Rash    Tinnitus, bilateral 02/05/2018    Tinnitus of both ears    Unspecified acute conjunctivitis, unspecified eye 07/13/2018    Acute conjunctivitis    Unspecified blepharitis right eye, unspecified eyelid 06/16/2020    Blepharitis of both eyes    Viral upper respiratory tract infection 06/06/2023   [2]    Past Surgical History:  Procedure Laterality Date    COLONOSCOPY  11/20/2012    Complete Colonoscopy    OTHER SURGICAL HISTORY  11/20/2012    Cholecystotomy    THYROID SURGERY  03/15/2014    Thyroid Surgery    TUBAL LIGATION  03/15/2014    Tubal Ligation   [3]   Current Outpatient Medications on File Prior to Visit   Medication Sig Dispense Refill    aspirin 81 mg EC tablet Take by mouth.      biotin 5 mg capsule Take by mouth.      carvedilol (Coreg) 25 mg tablet TAKE 1 TABLET BY MOUTH 2 TIMES WITH MEALS FOR BLOOD PRESSURE 180 tablet 3    cholecalciferol (Vitamin D-3) 125 MCG (5000 UT) capsule Take 1 capsule (125 mcg) by mouth once daily.      clobetasol (Temovate) 0.05 % cream Use topical twice daily for 7 to 10 days, then discontinue.  Not meant to be used on the face or groin.  Not meant to be used more than 10 days before stopping.  After discontinuing, please resume moisturizer twice daily to help reduce rash recurrence. 15 g 0    fluticasone (Flonase) 50 mcg/actuation nasal spray ADMINISTER 1 SPRAY INTO EACH NOSTRIL 2 TIMES DAILY 48 g 3    hydroCHLOROthiazide (HYDRODiuril) 25 mg tablet TAKE 1 TABLET BY MOUTH ONCE DAILY 90 tablet 3    L.acid/L.casei/B.bif/B.trae/FOS (PROBIOTIC BLEND ORAL) Take 1 capsule by mouth early in the morning..      NON FORMULARY 1 each 2 times a day. Mitocore      nystatin (Mycostatin) cream       potassium chloride CR 10 mEq ER tablet TAKE 1 TABLET BY MOUTH EVERY DAY for potassium 90 tablet 3    pravastatin (Pravachol) 40 mg tablet Take 1 tablet (40 mg) by mouth every other day. For cholesterol      red yeast rice 600 mg capsule Take 600 mg by mouth early in the morning..      tirzepatide (Mounjaro) 5 mg/0.5 mL pen injector Inject 5 mg under the skin every 7 days. 6 mL 1    valACYclovir (Valtrex) 1 gram tablet Take 1 tablet (1,000 mg) by mouth 2 times a day as needed (oral sores). PRN      Ventolin HFA 90 mcg/actuation inhaler Inhale 2 puffs every 4 hours if needed for wheezing. 18 g  2    [DISCONTINUED] blood sugar diagnostic (Advanced Gluc Meter Test Strip) strip TEST ONCE DAILY (Patient not taking: Reported on 2025)      [DISCONTINUED] FreeStyle glucose monitoring kit once daily. (Patient not taking: Reported on 2025)      [DISCONTINUED] OneTouch Ultra Test strip once daily. (Patient not taking: Reported on 2025)      [DISCONTINUED] predniSONE (Deltasone) 10 mg tablet Take 4 tablets once daily with food, for 2 days, then 3 tablets once daily for 2 days, then 2 tablets once daily for 2 days, and then 1 tablet  daily for 2 days, then discontinue (Patient not taking: Reported on 2025) 20 tablet 0     No current facility-administered medications on file prior to visit.   [4]   Family History  Problem Relation Name Age of Onset    Breast cancer Mother           after subdural bleed    Dementia Mother      Colon cancer Father      Kidney cancer Sister      Other (overweight) Sister      Glaucoma Neg Hx      Macular degeneration Neg Hx

## 2025-06-17 ENCOUNTER — OUTSIDE PROCEDURE (OUTPATIENT)
Dept: ORTHOPEDIC SURGERY | Facility: CLINIC | Age: 75
End: 2025-06-17
Payer: MEDICARE

## 2025-06-17 PROCEDURE — 23430 REPAIR BICEPS TENDON: CPT | Performed by: ORTHOPAEDIC SURGERY

## 2025-06-17 PROCEDURE — 23430 REPAIR BICEPS TENDON: CPT | Performed by: PHYSICIAN ASSISTANT

## 2025-06-17 PROCEDURE — 23472 RECONSTRUCT SHOULDER JOINT: CPT | Performed by: ORTHOPAEDIC SURGERY

## 2025-06-17 PROCEDURE — 23472 RECONSTRUCT SHOULDER JOINT: CPT | Performed by: PHYSICIAN ASSISTANT

## 2025-06-17 NOTE — PROGRESS NOTES
Preop diagnosis: Right shoulder osteoarthritis    Postop diagnosis:  Right shoulder osteoarthritis  Right shoulder rotator cuff tear  Right shoulder long head bicep tendon tear    Procedure:    Right shoulder reverse total shoulder replacement 31452  Right shoulder proximal long head bicep open tenodesis 96620    Surgeon:  Edgardo Dash M.D.      Assistant:  Edgardo Maddox physician assistant (PAC) was required and present throughout the entire case.  Given the nature of the disease process and the procedure, a skilled surgical first assistant was necessary during the entire case.  The assistant was necessary to hold retractors and manipulate extremity during the procedure.  A certified scrub tech was also present at the back table managing instruments with supplies for the surgical case        Anesthesia:    General With a scalene block      Blood loss: 100 cc    Fluids: Less than 2 L      Indications:  right shoulder severe end-stage osteoarthrosis failing a lengthy conservative treatment program now for shoulder replacement      Summation of event:    The patient was placed in the beachchair position and underwent routine prep and drape of the right shoulder  after induction of anesethia    Standard deltopectoral approach was used splitting skin for roughly 8-10 cm starting at the coracoid and extending down the arm.  Subcutaneous flaps were created and we exposed the deltopectoral interval.  Cephalic vein and deltoid were taken superior laterally and the pectoralis major released inferiorly.  We released part of the pectoralis off the humerus to aid with exposure.  We bluntly dissected under the conjoined tendon and placed a retractor medially against the conjoined tendon to protect the neurovascular structures.  Laterally we retracted off the deltoid attacking the vein.    The subscapularis partly intact.  The inferior two thirds was peeled off the lesser tuberosity.  It was tagged to be  "repaired later at the end of the case through if it hold the prosthesis with a #2 FiberWire.  There was severe end-stage arthrosis with significant bony erosion and posterior wear of the glenoid and humeral head.  There was absent supraspinatus tendon superiorly.  We proceeded with reverse total shoulder replacement      The long head of the biceps tendon inflamed and encased in a large synovial sheath.  Sheath was open the bicep tendon was frayed and torn as it entered the shoulder joint.  The bicep tendon was tenodesed to the anterior cortex of the humerus below the bicipital groove with a #2 FiberWire through a small drill hole.  This restored normal resting length and tension of the bicep construct.  The intra-articular portion of the bicep was discarded    The humeral head was easily dislocated at this time with external rotation.  The osteophytes were removed circumferentially around the humerus and we began hand reaming the humerus to a final diameter of 10 mm.    Using the appropriate cutting guide the humeral head was cut at 20° retroversion and further osteophytes were cleaned removed posteriorly to aid in the exposure and implant insertion.    At this time retractors were placed on the posteriorly,  superiorly,  and anteriorly along the glenoid rim to allow for complete exposure to the glenoid face.   We then cleaned and removed degenerative labral tissue and old bicep tissue from around the rim of the glenoid to further provide exposure to the glenoid.     A centering pin was placed in the glenoid to correct version, a centering hole was then created and we planed the glenoid to a smooth flush surface.  After final preparation the baseplate was inserted with a 15 mm stem.  The purchase was excellent and we proceeded to fill the baseplate with 2 bicortical screw 38 mm and 40 mm  in length, and then placed  \"end caps\" on the screws so as to convert them to locking screws.  A 40 mm glenosphere with +5 mm of " lateral offset was fixed on the baseplate and its locking mechanism was also confirmed.    At this time final preparation of the humeral stem was completed and we inserted the 10 mm reverse compatible trabecular metal stem at 20° retroversion at the appropriate height and inclination.    We then trialed liners and after multiple reductions we placed a final buildup of 0 mm with a 60 degree constraint radius.    Once we completed the reduction of the shoulder, it was stable in all planes checked with no subcoracoid or subacromial impingement.  We lavaged and  irrigated and we closed what remnant of the subscapularis remained to  further enhance  stability.  Final  lavaging and irrigaion throughout the entire wound was completed and  hemostasis was confirmed.      We allow the deltopectoral interval to close loosely with 2-0 Vicryl, closure of the subcutaneous tissue with 2-0 Vicryl and a running 4-0 Monocryl was completed followed by application of a compressive dressing.  A drain was used if needed and the patient was taken to recovery room and an xr was taken and is pending review.

## 2025-06-23 ENCOUNTER — OFFICE VISIT (OUTPATIENT)
Dept: PRIMARY CARE | Facility: CLINIC | Age: 75
End: 2025-06-23
Payer: MEDICARE

## 2025-06-23 VITALS
TEMPERATURE: 98.6 F | RESPIRATION RATE: 16 BRPM | SYSTOLIC BLOOD PRESSURE: 135 MMHG | OXYGEN SATURATION: 97 % | HEART RATE: 80 BPM | DIASTOLIC BLOOD PRESSURE: 72 MMHG

## 2025-06-23 DIAGNOSIS — J20.9 ACUTE BRONCHITIS, UNSPECIFIED ORGANISM: Primary | ICD-10-CM

## 2025-06-23 PROCEDURE — 1036F TOBACCO NON-USER: CPT | Performed by: NURSE PRACTITIONER

## 2025-06-23 PROCEDURE — 1160F RVW MEDS BY RX/DR IN RCRD: CPT | Performed by: NURSE PRACTITIONER

## 2025-06-23 PROCEDURE — 99213 OFFICE O/P EST LOW 20 MIN: CPT | Performed by: NURSE PRACTITIONER

## 2025-06-23 PROCEDURE — 1159F MED LIST DOCD IN RCRD: CPT | Performed by: NURSE PRACTITIONER

## 2025-06-23 PROCEDURE — 3075F SYST BP GE 130 - 139MM HG: CPT | Performed by: NURSE PRACTITIONER

## 2025-06-23 PROCEDURE — 3078F DIAST BP <80 MM HG: CPT | Performed by: NURSE PRACTITIONER

## 2025-06-23 RX ORDER — BENZONATATE 200 MG/1
200 CAPSULE ORAL 3 TIMES DAILY PRN
Qty: 42 CAPSULE | Refills: 0 | Status: SHIPPED | OUTPATIENT
Start: 2025-06-23 | End: 2025-07-23

## 2025-06-23 RX ORDER — AZITHROMYCIN 250 MG/1
TABLET, FILM COATED ORAL
Qty: 6 TABLET | Refills: 0 | Status: SHIPPED | OUTPATIENT
Start: 2025-06-23 | End: 2025-06-28

## 2025-06-23 ASSESSMENT — ENCOUNTER SYMPTOMS
VOMITING: 0
DIZZINESS: 0
FEVER: 0
TROUBLE SWALLOWING: 0
CONSTIPATION: 0
HEADACHES: 0
SINUS PAIN: 0
PALPITATIONS: 0
FATIGUE: 0
WHEEZING: 1
RHINORRHEA: 0
SINUS PRESSURE: 0
CHILLS: 0
NUMBNESS: 0
SHORTNESS OF BREATH: 0
CHEST TIGHTNESS: 0
NAUSEA: 0
DIARRHEA: 0
MYALGIAS: 0
ARTHRALGIAS: 0
COUGH: 1
SORE THROAT: 0

## 2025-06-23 NOTE — PROGRESS NOTES
Subjective   Patient ID: Yoselyn Montana is a 75 y.o. female who presents for Bronchitis.    HPI   Coughing prior to surgery last Tuesday, patient thinks due to Post Nasal drip.    Had Reverse Total shoulder 6/17. She coughed up old blood the day after.   2 days now with green productive sputum, feels like mucous production is worsening.   Intermittent mild wheezing.   Burning in chest.       Review of Systems   Constitutional:  Negative for chills, fatigue and fever.   HENT:  Positive for postnasal drip. Negative for congestion, ear pain, rhinorrhea, sinus pressure, sinus pain, sore throat and trouble swallowing.    Respiratory:  Positive for cough and wheezing. Negative for chest tightness and shortness of breath.    Cardiovascular:  Negative for chest pain, palpitations and leg swelling.   Gastrointestinal:  Negative for constipation, diarrhea, nausea and vomiting.   Musculoskeletal:  Negative for arthralgias and myalgias.   Skin:  Negative for rash.   Neurological:  Negative for dizziness, numbness and headaches.   All other systems reviewed and are negative.      Objective   /72   Pulse 80   Temp 37 °C (98.6 °F)   Resp 16   SpO2 97%     Physical Exam  Vitals reviewed.   Constitutional:       Appearance: Normal appearance. She is normal weight.   HENT:      Right Ear: Tympanic membrane normal.      Left Ear: Tympanic membrane normal.      Nose: Nose normal.      Mouth/Throat:      Mouth: Mucous membranes are moist.      Pharynx: Posterior oropharyngeal erythema present.   Eyes:      Conjunctiva/sclera: Conjunctivae normal.   Cardiovascular:      Rate and Rhythm: Normal rate and regular rhythm.      Pulses: Normal pulses.      Heart sounds: Normal heart sounds.   Pulmonary:      Effort: Pulmonary effort is normal.      Breath sounds: Normal breath sounds.   Musculoskeletal:      Cervical back: Neck supple.      Comments: ROM WNL except right upper surgical extremity.    Skin:     General: Skin is warm  and dry.      Comments: Surgical wound covered with bandage with mild swelling and tenderness on right shoulder.    Neurological:      General: No focal deficit present.      Mental Status: She is alert and oriented to person, place, and time.   Psychiatric:         Mood and Affect: Mood normal.         Assessment/Plan   Assessment & Plan  Acute bronchitis, unspecified organism    Orders:    azithromycin (Zithromax) 250 mg tablet; Take 2 tablets (500 mg) by mouth once daily for 1 day, THEN 1 tablet (250 mg) once daily for 4 days. Take 2 tabs (500 mg) by mouth today, than 1 daily for 4 days.    benzonatate (Tessalon) 200 mg capsule; Take 1 capsule (200 mg) by mouth 3 times a day as needed for cough. Do not crush or chew.    -PND prior to surgery. Possibly due to URI, but patient does have allergies.  -Low suspicion for PE or pneumonia. Afebrile. SpO2 97%. Lungs CTAB on exam.   -Recommend patient hold off on antibiotics for at least 3-4 days before starting due to likely viral infection.   -Continue to use inspirometer.   -Increase fluids.   -Use inhaler as needed for wheezing.   -Discussed s/s that would warrant ER eval, including symptoms of PE.   - Call or return to office prn if these symptoms worsen or fail to improve as anticipated.  -Follow up with Dr. Smalls as scheduled.

## 2025-07-03 ENCOUNTER — HOSPITAL ENCOUNTER (OUTPATIENT)
Dept: RADIOLOGY | Facility: CLINIC | Age: 75
Discharge: HOME | End: 2025-07-03
Payer: MEDICARE

## 2025-07-03 ENCOUNTER — OFFICE VISIT (OUTPATIENT)
Dept: ORTHOPEDIC SURGERY | Facility: CLINIC | Age: 75
End: 2025-07-03
Payer: MEDICARE

## 2025-07-03 DIAGNOSIS — Z96.611 STATUS POST TOTAL SHOULDER REPLACEMENT, RIGHT: ICD-10-CM

## 2025-07-03 PROCEDURE — 99212 OFFICE O/P EST SF 10 MIN: CPT | Performed by: PHYSICIAN ASSISTANT

## 2025-07-03 PROCEDURE — 73030 X-RAY EXAM OF SHOULDER: CPT | Mod: RT

## 2025-07-03 RX ORDER — CYCLOBENZAPRINE HCL 5 MG
5 TABLET ORAL 3 TIMES DAILY PRN
Qty: 30 TABLET | Refills: 0 | Status: SHIPPED | OUTPATIENT
Start: 2025-07-03 | End: 2025-07-13

## 2025-07-03 NOTE — PROGRESS NOTES
History of present illness patient is 2-week status post right reverse total shoulder arthroplasty.  Overall she is doing very well.  She has no complaints of pain just some tightness.      Physical exam:      General: No acute distress or breathing difficulty or discomfort, pleasant and cooperative with the examination.    Extremities: Right shoulder incisions clean dry and intact.  She can leave this open area there is no drainage redness or evidence of infection      Diagnostic studies: X-rays 2 views of the right shoulder show well aligned well-positioned right reverse total shoulder arthroplasty of the read by Dr. Edgardo Dash    Impression: Status post right reverse total shoulder arthroplasty    Plan: Patient will continue to walk with the sling only when she is up and about.  We discussed hand wrist and elbow as well as gentle supine stretches which she may now perform.  She will follow-up in 3 weeks with x-rays 2 views of the right shoulder and range of motion check

## 2025-07-16 ENCOUNTER — TELEPHONE (OUTPATIENT)
Dept: ORTHOPEDIC SURGERY | Facility: CLINIC | Age: 75
End: 2025-07-16
Payer: MEDICARE

## 2025-07-16 NOTE — TELEPHONE ENCOUNTER
Patient called and had some questions about her sling.  She wasn't sure when she could come out of it.  She also would like to know if she can drive ( she states she has already driven) and if she can use Vit E oil on her scar.  Edgardo Maddox PA-C was in the office and stated she can only come out of the sling when she is sitting at home, eating, sleeping and showering. She is to remain in sling when up, out and about.  She can drive as long as she is not on narcotic medication and she can use vitamin E oil on her scar.  Patient was appreciative and stated understanding.  She had no further questions.

## 2025-07-17 ENCOUNTER — APPOINTMENT (OUTPATIENT)
Dept: PRIMARY CARE | Facility: CLINIC | Age: 75
End: 2025-07-17
Payer: MEDICARE

## 2025-07-21 LAB
ATRIAL RATE: 68 BPM
P AXIS: 29 DEGREES
P OFFSET: 192 MS
P ONSET: 144 MS
PR INTERVAL: 168 MS
Q ONSET: 228 MS
QRS COUNT: 11 BEATS
QRS DURATION: 68 MS
QT INTERVAL: 398 MS
QTC CALCULATION(BAZETT): 423 MS
QTC FREDERICIA: 415 MS
R AXIS: -8 DEGREES
T AXIS: 47 DEGREES
T OFFSET: 427 MS
VENTRICULAR RATE: 68 BPM

## 2025-07-28 ENCOUNTER — APPOINTMENT (OUTPATIENT)
Dept: ORTHOPEDIC SURGERY | Facility: CLINIC | Age: 75
End: 2025-07-28
Payer: MEDICARE

## 2025-07-28 ENCOUNTER — HOSPITAL ENCOUNTER (OUTPATIENT)
Dept: RADIOLOGY | Facility: HOSPITAL | Age: 75
Discharge: HOME | End: 2025-07-28
Payer: MEDICARE

## 2025-07-28 DIAGNOSIS — Z96.611 STATUS POST TOTAL SHOULDER REPLACEMENT, RIGHT: ICD-10-CM

## 2025-07-28 PROCEDURE — 73030 X-RAY EXAM OF SHOULDER: CPT | Mod: RIGHT SIDE | Performed by: RADIOLOGY

## 2025-07-28 PROCEDURE — 99024 POSTOP FOLLOW-UP VISIT: CPT | Performed by: PHYSICIAN ASSISTANT

## 2025-07-28 PROCEDURE — 73030 X-RAY EXAM OF SHOULDER: CPT | Mod: RT

## 2025-07-28 NOTE — PROGRESS NOTES
History of present illness: Right total shoulder replacement doing well    Physical exam:    General: No acute distress or breathing difficulty or discomfort, pleasant and cooperative with the examination.    Extremities: Dressing was removed if in place .  The surgical incision was clean and dry without drainage or infection.  The soft tissues were otherwise intact.  There was no abnormal limb swelling or evidence to indicate blood clots or deep vein thrombosis.    There is no gross evidence of redness or cellulitis.    Motion was within normal limits for postop visit.  The patient could move the extremity on the operative limb in a normal fashion without significant change.  Neurovascular status was unchanged.  Small 1 suture was removed she had just mild discomfort over the coracoid minimal in nature forward flexion well beyond 90 has been displayed    Diagnostic studies: Show well-positioned right reverse total shoulder    Impression: Continue gentle self-guided stretching add 1 to 2 pound resistance in the supine position        Plan: May discontinue sling    Begin supine stretching with 1 to 2 pound resistance    Wall walking gentle range of motion program low impact I would like to have her avoid internal rotation and cross arm activity no need for formal therapy follow-up 5 to 6 weeks AP axillary x-ray range of motion check

## 2025-07-31 ENCOUNTER — APPOINTMENT (OUTPATIENT)
Dept: ORTHOPEDIC SURGERY | Facility: CLINIC | Age: 75
End: 2025-07-31
Payer: MEDICARE

## 2025-08-13 ENCOUNTER — OFFICE VISIT (OUTPATIENT)
Dept: PRIMARY CARE | Facility: CLINIC | Age: 75
End: 2025-08-13
Payer: MEDICARE

## 2025-08-13 VITALS
HEART RATE: 84 BPM | DIASTOLIC BLOOD PRESSURE: 70 MMHG | RESPIRATION RATE: 16 BRPM | SYSTOLIC BLOOD PRESSURE: 132 MMHG | BODY MASS INDEX: 35.01 KG/M2 | OXYGEN SATURATION: 98 % | TEMPERATURE: 98 F | WEIGHT: 185.4 LBS | HEIGHT: 61 IN

## 2025-08-13 DIAGNOSIS — N18.31 CHRONIC KIDNEY DISEASE, STAGE 3A (MULTI): ICD-10-CM

## 2025-08-13 DIAGNOSIS — D23.5 DERMOID CYST OF SKIN OF BACK: Primary | ICD-10-CM

## 2025-08-13 DIAGNOSIS — L30.9 ECZEMA, UNSPECIFIED TYPE: ICD-10-CM

## 2025-08-13 PROCEDURE — 1036F TOBACCO NON-USER: CPT | Performed by: NURSE PRACTITIONER

## 2025-08-13 PROCEDURE — 99213 OFFICE O/P EST LOW 20 MIN: CPT | Performed by: NURSE PRACTITIONER

## 2025-08-13 PROCEDURE — 3078F DIAST BP <80 MM HG: CPT | Performed by: NURSE PRACTITIONER

## 2025-08-13 PROCEDURE — 1159F MED LIST DOCD IN RCRD: CPT | Performed by: NURSE PRACTITIONER

## 2025-08-13 PROCEDURE — 3075F SYST BP GE 130 - 139MM HG: CPT | Performed by: NURSE PRACTITIONER

## 2025-08-13 ASSESSMENT — ENCOUNTER SYMPTOMS
COLOR CHANGE: 0
WOUND: 0

## 2025-08-18 ENCOUNTER — APPOINTMENT (OUTPATIENT)
Dept: DERMATOLOGY | Facility: CLINIC | Age: 75
End: 2025-08-18
Payer: MEDICARE

## 2025-08-18 DIAGNOSIS — L72.3 SEBACEOUS CYST: Primary | ICD-10-CM

## 2025-08-18 DIAGNOSIS — R20.8 SKIN PAIN: ICD-10-CM

## 2025-08-18 PROCEDURE — 99203 OFFICE O/P NEW LOW 30 MIN: CPT | Performed by: DERMATOLOGY

## 2025-08-18 PROCEDURE — 11900 INJECT SKIN LESIONS </W 7: CPT | Performed by: DERMATOLOGY

## 2025-08-18 PROCEDURE — 1159F MED LIST DOCD IN RCRD: CPT | Performed by: DERMATOLOGY

## 2025-08-18 RX ORDER — TRIAMCINOLONE ACETONIDE 10 MG/ML
10 INJECTION, SUSPENSION INTRA-ARTICULAR; INTRALESIONAL ONCE
Status: COMPLETED | OUTPATIENT
Start: 2025-08-18 | End: 2025-08-18

## 2025-08-18 RX ADMIN — TRIAMCINOLONE ACETONIDE 10 MG: 10 INJECTION, SUSPENSION INTRA-ARTICULAR; INTRALESIONAL at 14:57

## 2025-08-25 ENCOUNTER — APPOINTMENT (OUTPATIENT)
Dept: DERMATOLOGY | Facility: CLINIC | Age: 75
End: 2025-08-25
Payer: MEDICARE

## 2025-08-31 DIAGNOSIS — R60.9 EDEMA, UNSPECIFIED TYPE: ICD-10-CM

## 2025-09-02 RX ORDER — HYDROCHLOROTHIAZIDE 25 MG/1
25 TABLET ORAL DAILY
Qty: 90 TABLET | Refills: 3 | Status: SHIPPED | OUTPATIENT
Start: 2025-09-02

## 2025-09-04 ENCOUNTER — OFFICE VISIT (OUTPATIENT)
Dept: ORTHOPEDIC SURGERY | Facility: CLINIC | Age: 75
End: 2025-09-04
Payer: MEDICARE

## 2025-09-04 ENCOUNTER — HOSPITAL ENCOUNTER (OUTPATIENT)
Dept: RADIOLOGY | Facility: CLINIC | Age: 75
Discharge: HOME | End: 2025-09-04
Payer: MEDICARE

## 2025-09-04 DIAGNOSIS — Z96.611 STATUS POST TOTAL SHOULDER REPLACEMENT, RIGHT: Primary | ICD-10-CM

## 2025-09-04 DIAGNOSIS — M19.011 PRIMARY OSTEOARTHRITIS OF RIGHT SHOULDER: ICD-10-CM

## 2025-09-04 PROCEDURE — 99212 OFFICE O/P EST SF 10 MIN: CPT | Performed by: ORTHOPAEDIC SURGERY

## 2025-09-04 PROCEDURE — 73030 X-RAY EXAM OF SHOULDER: CPT | Mod: RT

## 2025-09-04 PROCEDURE — 99024 POSTOP FOLLOW-UP VISIT: CPT | Performed by: ORTHOPAEDIC SURGERY

## 2025-09-04 RX ORDER — CYCLOBENZAPRINE HCL 5 MG
5 TABLET ORAL 3 TIMES DAILY PRN
Qty: 30 TABLET | Refills: 0 | Status: SHIPPED | OUTPATIENT
Start: 2025-09-04 | End: 2025-09-14

## 2025-11-17 ENCOUNTER — APPOINTMENT (OUTPATIENT)
Dept: PRIMARY CARE | Facility: CLINIC | Age: 75
End: 2025-11-17
Payer: MEDICARE

## 2025-12-04 ENCOUNTER — APPOINTMENT (OUTPATIENT)
Dept: PRIMARY CARE | Facility: CLINIC | Age: 75
End: 2025-12-04
Payer: MEDICARE

## 2026-02-17 ENCOUNTER — APPOINTMENT (OUTPATIENT)
Dept: DERMATOLOGY | Facility: CLINIC | Age: 76
End: 2026-02-17
Payer: MEDICARE

## 2026-06-04 ENCOUNTER — APPOINTMENT (OUTPATIENT)
Dept: OPHTHALMOLOGY | Facility: CLINIC | Age: 76
End: 2026-06-04
Payer: MEDICARE